# Patient Record
Sex: MALE | Race: ASIAN | NOT HISPANIC OR LATINO | URBAN - METROPOLITAN AREA
[De-identification: names, ages, dates, MRNs, and addresses within clinical notes are randomized per-mention and may not be internally consistent; named-entity substitution may affect disease eponyms.]

---

## 2021-01-22 ENCOUNTER — INPATIENT (INPATIENT)
Facility: HOSPITAL | Age: 71
LOS: 0 days | Discharge: HOME | End: 2021-01-23
Attending: INTERNAL MEDICINE | Admitting: INTERNAL MEDICINE
Payer: MEDICARE

## 2021-01-22 VITALS
TEMPERATURE: 98 F | OXYGEN SATURATION: 98 % | HEART RATE: 111 BPM | RESPIRATION RATE: 18 BRPM | DIASTOLIC BLOOD PRESSURE: 106 MMHG | SYSTOLIC BLOOD PRESSURE: 166 MMHG

## 2021-01-22 LAB
ALBUMIN SERPL ELPH-MCNC: 4.8 G/DL — SIGNIFICANT CHANGE UP (ref 3.5–5.2)
ALP SERPL-CCNC: 63 U/L — SIGNIFICANT CHANGE UP (ref 30–115)
ALT FLD-CCNC: 30 U/L — SIGNIFICANT CHANGE UP (ref 0–41)
ANION GAP SERPL CALC-SCNC: 13 MMOL/L — SIGNIFICANT CHANGE UP (ref 7–14)
AST SERPL-CCNC: 16 U/L — SIGNIFICANT CHANGE UP (ref 0–41)
BASOPHILS # BLD AUTO: 0.04 K/UL — SIGNIFICANT CHANGE UP (ref 0–0.2)
BASOPHILS NFR BLD AUTO: 0.5 % — SIGNIFICANT CHANGE UP (ref 0–1)
BILIRUB SERPL-MCNC: 0.7 MG/DL — SIGNIFICANT CHANGE UP (ref 0.2–1.2)
BUN SERPL-MCNC: 11 MG/DL — SIGNIFICANT CHANGE UP (ref 10–20)
CALCIUM SERPL-MCNC: 9.7 MG/DL — SIGNIFICANT CHANGE UP (ref 8.5–10.1)
CHLORIDE SERPL-SCNC: 87 MMOL/L — LOW (ref 98–110)
CO2 SERPL-SCNC: 24 MMOL/L — SIGNIFICANT CHANGE UP (ref 17–32)
CREAT SERPL-MCNC: 0.9 MG/DL — SIGNIFICANT CHANGE UP (ref 0.7–1.5)
EOSINOPHIL # BLD AUTO: 0.1 K/UL — SIGNIFICANT CHANGE UP (ref 0–0.7)
EOSINOPHIL NFR BLD AUTO: 1.2 % — SIGNIFICANT CHANGE UP (ref 0–8)
GLUCOSE SERPL-MCNC: 123 MG/DL — HIGH (ref 70–99)
HCT VFR BLD CALC: 45.3 % — SIGNIFICANT CHANGE UP (ref 42–52)
HGB BLD-MCNC: 15.8 G/DL — SIGNIFICANT CHANGE UP (ref 14–18)
IMM GRANULOCYTES NFR BLD AUTO: 0.5 % — HIGH (ref 0.1–0.3)
LYMPHOCYTES # BLD AUTO: 1.65 K/UL — SIGNIFICANT CHANGE UP (ref 1.2–3.4)
LYMPHOCYTES # BLD AUTO: 20.5 % — SIGNIFICANT CHANGE UP (ref 20.5–51.1)
MCHC RBC-ENTMCNC: 30.1 PG — SIGNIFICANT CHANGE UP (ref 27–31)
MCHC RBC-ENTMCNC: 34.9 G/DL — SIGNIFICANT CHANGE UP (ref 32–37)
MCV RBC AUTO: 86.3 FL — SIGNIFICANT CHANGE UP (ref 80–94)
MONOCYTES # BLD AUTO: 0.62 K/UL — HIGH (ref 0.1–0.6)
MONOCYTES NFR BLD AUTO: 7.7 % — SIGNIFICANT CHANGE UP (ref 1.7–9.3)
NEUTROPHILS # BLD AUTO: 5.58 K/UL — SIGNIFICANT CHANGE UP (ref 1.4–6.5)
NEUTROPHILS NFR BLD AUTO: 69.6 % — SIGNIFICANT CHANGE UP (ref 42.2–75.2)
NRBC # BLD: 0 /100 WBCS — SIGNIFICANT CHANGE UP (ref 0–0)
PLATELET # BLD AUTO: 305 K/UL — SIGNIFICANT CHANGE UP (ref 130–400)
POTASSIUM SERPL-MCNC: 3.7 MMOL/L — SIGNIFICANT CHANGE UP (ref 3.5–5)
POTASSIUM SERPL-SCNC: 3.7 MMOL/L — SIGNIFICANT CHANGE UP (ref 3.5–5)
PROT SERPL-MCNC: 7.9 G/DL — SIGNIFICANT CHANGE UP (ref 6–8)
RAPID RVP RESULT: SIGNIFICANT CHANGE UP
RBC # BLD: 5.25 M/UL — SIGNIFICANT CHANGE UP (ref 4.7–6.1)
RBC # FLD: 12.6 % — SIGNIFICANT CHANGE UP (ref 11.5–14.5)
SARS-COV-2 RNA SPEC QL NAA+PROBE: SIGNIFICANT CHANGE UP
SODIUM SERPL-SCNC: 124 MMOL/L — LOW (ref 135–146)
TROPONIN T SERPL-MCNC: <0.01 NG/ML — SIGNIFICANT CHANGE UP
WBC # BLD: 8.03 K/UL — SIGNIFICANT CHANGE UP (ref 4.8–10.8)
WBC # FLD AUTO: 8.03 K/UL — SIGNIFICANT CHANGE UP (ref 4.8–10.8)

## 2021-01-22 RX ORDER — LOSARTAN POTASSIUM 100 MG/1
100 TABLET, FILM COATED ORAL DAILY
Refills: 0 | Status: DISCONTINUED | OUTPATIENT
Start: 2021-01-22 | End: 2021-01-23

## 2021-01-22 RX ORDER — METOCLOPRAMIDE HCL 10 MG
10 TABLET ORAL ONCE
Refills: 0 | Status: COMPLETED | OUTPATIENT
Start: 2021-01-22 | End: 2021-01-22

## 2021-01-22 RX ORDER — CHLORHEXIDINE GLUCONATE 213 G/1000ML
1 SOLUTION TOPICAL
Refills: 0 | Status: DISCONTINUED | OUTPATIENT
Start: 2021-01-22 | End: 2021-01-23

## 2021-01-22 RX ORDER — AMLODIPINE BESYLATE 2.5 MG/1
5 TABLET ORAL DAILY
Refills: 0 | Status: DISCONTINUED | OUTPATIENT
Start: 2021-01-22 | End: 2021-01-23

## 2021-01-22 RX ORDER — HEPARIN SODIUM 5000 [USP'U]/ML
5000 INJECTION INTRAVENOUS; SUBCUTANEOUS EVERY 8 HOURS
Refills: 0 | Status: DISCONTINUED | OUTPATIENT
Start: 2021-01-22 | End: 2021-01-23

## 2021-01-22 RX ORDER — SODIUM CHLORIDE 9 MG/ML
1000 INJECTION INTRAMUSCULAR; INTRAVENOUS; SUBCUTANEOUS ONCE
Refills: 0 | Status: COMPLETED | OUTPATIENT
Start: 2021-01-22 | End: 2021-01-22

## 2021-01-22 RX ADMIN — HEPARIN SODIUM 5000 UNIT(S): 5000 INJECTION INTRAVENOUS; SUBCUTANEOUS at 15:02

## 2021-01-22 RX ADMIN — Medication 104 MILLIGRAM(S): at 06:41

## 2021-01-22 RX ADMIN — AMLODIPINE BESYLATE 5 MILLIGRAM(S): 2.5 TABLET ORAL at 12:08

## 2021-01-22 RX ADMIN — SODIUM CHLORIDE 1000 MILLILITER(S): 9 INJECTION INTRAMUSCULAR; INTRAVENOUS; SUBCUTANEOUS at 06:40

## 2021-01-22 RX ADMIN — LOSARTAN POTASSIUM 100 MILLIGRAM(S): 100 TABLET, FILM COATED ORAL at 12:08

## 2021-01-22 NOTE — ED PROVIDER NOTE - NS ED ROS FT
CONSTITUTIONAL: Negatve   SKIN: Negatve   HEAD: Negatve   EYES: Negatve   ENT: Negatve   NECK: Negatve   CARD:Negatve   RESP:Negatve   ABD: Negatve   EXT: Negatve  LYMPH: Negatve   NEURO: see hpi  PSYCH: Negatve

## 2021-01-22 NOTE — H&P ADULT - ASSESSMENT
[age] year old male with hx of HTN, active smoker with hx of COPD/emphysema, (apical scarring on outpt CT ), hx of thoracic aortic aneurysm with 4.1 cm (July 2020 last CT scan), hx of chronic hyponatremia (127), mild CAD (cath 2019, no stents) presenting for 1 day of headache and elevated bp at 180 mmHg systolic    Headache and elevated bp  -controlled now at 150 mmHG  -CT brain negative, nl kidney function  -resumed home meds, added norvasc 5 mg    - fu urine microalbumin     2.   COPD/emphysema, chronic apex scarring  -not in acute exacerbation   -counseled on need to quit smoking  -spoke to PMD Dr Arechiga, previous CT scan showed emphysematous changes and apices scarring   -duonebs started     3.   chronic hyponatremia, no AMS   -no need for acute intervention   -as per PMD baseline around 127, inpatient 124  -fu serum and urine osm, urine Na   -nephrology consulted for optimization of bp meds and hypoNa     4.  mild CAD (no hx of stents)/ hx of 4.1 cm ascending AA  - not on any statin or BB  - fu lipid profile, a1c   - outpt surveillance of AA    GI ppx: Po protonix 40 mg QD  DVT ppx: Heparin SC   Activity : ambulate as tolerated  Diet : DASH/TLC  Dispo: Home   Full Code  [age] year old male with hx of HTN, active smoker with hx of COPD/emphysema, (apical scarring on outpt CT ), hx of thoracic aortic aneurysm with 4.1 cm (July 2020 last CT scan), hx of chronic hyponatremia (127), mild CAD (cath 2019, no stents) presenting for 1 day of headache and elevated bp at 180 mmHg systolic    Headache and elevated bp  - controlled now at 150 mmHG  - CT brain negative, nl kidney function  -on olmesartan 40 mg ( switched to losartan), added norvasc 5 mg    - fu urine microalbumin/ ptein/ Cr ratio  - fu 2D echocardiogram      2.   COPD/emphysema, chronic apex scarring  -not in acute exacerbation   -counseled on need to quit smoking  -spoke to PMD Dr Arechiga, previous CT scan showed emphysematous changes and apices scarring   -duonebs started     3.   chronic hyponatremia, no AMS   -no need for acute intervention   -as per PMD baseline around 127, inpatient 124  -fu serum and urine osm, urine Na   -nephrology consulted for optimization of bp meds and hypoNa     4.  mild CAD (no hx of stents)/ hx of 4.1 cm ascending AA  - not on any statin or BB  - fu lipid profile, a1c   - outpt surveillance of AA    GI ppx: Po protonix 40 mg QD  DVT ppx: Heparin SC   Activity : ambulate as tolerated  Diet : DASH/TLC  Dispo: Home   Full Code

## 2021-01-22 NOTE — H&P ADULT - ATTENDING COMMENTS
71 yo male with PMH of HTN, CAD, COPD, thoracic aortic aneurysm 4.1cm, chronic hyponatremia came to ED for headache and elevated bp at 180 mmHg systolic. His BP has been elevated persistently for the last few days 170-180. He also c/o headache, he denies nausea, vomiting, blurry vision, SOB or chest pain.     PHYSICAL EXAM:  GENERAL: NAD, well-developed.  HEAD:  Atraumatic, Normocephalic.  EYES: EOMI, PERRLA, conjunctiva and sclera clear.  NECK: Supple, No JVD.  CHEST/LUNG: Clear to auscultation bilaterally; No wheeze.  HEART: Regular rate and rhythm; S1 S2.   ABDOMEN: Soft, Nontender, Nondistended; Bowel sounds present.  EXTREMITIES:  2+ Peripheral Pulses, No clubbing, cyanosis, or edema.  PSYCH: AAOx3.  NEUROLOGY: non-focal.  SKIN: No rashes or lesions.    A/P:   Moderate Hyponatremia: Chronic but worsening.   Continue Benicar (Losartan while inpatient), add Amlodipine 5mg daily.     Chronic Hyponatremia: Na 124.   Possibly from SAIDH, check urine Na, osmol, fluid restriction.     COPD stable. No wheezing.   Discharge home in 24hrs.

## 2021-01-22 NOTE — H&P ADULT - NSICDXPASTMEDICALHX_GEN_ALL_CORE_FT
PAST MEDICAL HISTORY:  Chronic hyponatremia     COPD, mild     Emphysema/COPD     Hypertension     Thoracic aortic aneurysm

## 2021-01-22 NOTE — ED PROVIDER NOTE - PHYSICAL EXAMINATION
VITAL SIGNS: I have reviewed nursing notes and confirm.  CONSTITUTIONAL: Well-developed; well-nourished; in no acute distress.  SKIN: Skin exam is warm and dry, no acute rash.  HEAD: Normocephalic; atraumatic.  EYES: PERRL, EOM intact; conjunctiva and sclera clear.  ENT: No nasal discharge; airway clear.  poor dentition, caries. dried blood at base of right bottom molars. no drainage, edema or abscess appreciated. non tender. no FB.  NECK: Supple; non tender.  CARD:+ S1, S2   RESP: No wheezes, rales or rhonchi.  ABD: Normal bowel sounds; soft; non-distended; non-tender;  EXT: Normal ROM. No cyanosis or edema.  LYMPH: No acute adenopathy.  NEURO: Alert and oriented, face symmetric. Strength 5/5 x 4 ext and symmetric, nml gross motor movement, nml RRM/HORTENSIA/FTN, nml gait. No focal deficits noted.  PSYCH: Cooperative, appropriate.

## 2021-01-22 NOTE — CONSULT NOTE ADULT - ASSESSMENT
70 year old male with hx of HTN, active smoker with hx of COPD/emphysema, (apical scarring on outpt CT ), hx of thoracic aortic aneurysm with 4.1 cm (July 2020 last CT scan), hx of chronic hyponatremia (127), mild CAD (cath 2019, no stents) presenting for 1 day of headache and elevated bp at 180 mmHg systolic. found to have hyponatremia     ·	Hyponatremia chronic rule out SIADH / Smoking / copd related  ·	check UA and urine lytes U osm serum osmolarity uric acid and TSH   ·	free water restriction to 1l for now  ·	do not add salt tablets   ·	increase amlodipine to 10 mg poq24h and continue ARB / no diuretics to be added / Add Labetalol 100 mg po q12h     will follow

## 2021-01-22 NOTE — H&P ADULT - HISTORY OF PRESENT ILLNESS
70 year old male with hx of HTN, active smoker with hx of COPD/emphysema, (apical scarring on outpt CT ), hx of thoracic aortic aneurysm with 4.1 cm (July 2020 last CT scan), hx of chronic hyponatremia (127), mild CAD (cath 2019, no stents) presenting for 1 day of headache and elevated bp at 180 mmHg systolic.     Patient states his bp was elevated in spite of taking his medication and he has been having headache for the last couple of days worst yesterday. He denies any chest pain, no shortness of breath, no increased abdominal girth, no lower extremity edema, no fever, no chills, no abdominal pain.     Vitals when examined stable with bp 150 systolic, CT brain negative for any acute processes   labs normal except for hypoNa 124 (chronic), troponin negative   patient being admitted for further work up 70 year old male with hx of HTN, active smoker with hx of COPD/emphysema, (apical scarring on outpt CT ), hx of thoracic aortic aneurysm with 4.1 cm (July 2020 last CT scan), hx of chronic hyponatremia (127), mild CAD (cath 2019, no stents) presenting for 1 day of headache and elevated bp at 180 mmHg systolic.     Patient states his bp was elevated in spite of taking his medication and he has been having headache for the last couple of days worst yesterday. He denies any chest pain, no shortness of breath, no increased abdominal girth, no lower extremity edema, no fever, no chills, no abdominal pain.     Vitals when examined stable with bp 150 systolic, CT brain negative for any acute processes   labs normal except for hypoNa 124 (chronic), troponin negative , ecg no ischemic signs or LVH  patient being admitted for further work up

## 2021-01-22 NOTE — ED PROVIDER NOTE - CLINICAL SUMMARY MEDICAL DECISION MAKING FREE TEXT BOX
cardona, htn. labs, ct head, ekg, fluids, supportive care. cardona, htn. labs, ct head, ekg, fluids, supportive care.  found to be hyponatremic. NS given. CT head neg. will admit.

## 2021-01-22 NOTE — ED ADULT NURSE NOTE - OBJECTIVE STATEMENT
Pt. complains of right sided headache, and elevated blood pressure started this morning. Pt. also complains of bleeding to right side of mouth. Pt. reported to have had low grade temps a few days ago, however no fevers today

## 2021-01-22 NOTE — ED ADULT TRIAGE NOTE - CHIEF COMPLAINT QUOTE
pt BIBA from home for fever and SOB. Pt is deaf and cantonese speaking. Pt wife endorses that pt has had fevers and SOB x2 days, right sided headache, high blood pressure started this morning ("180") and started taking a chinese blood pressure medicine "now he is bleeding from his teeth"   Pt afebrile in triage, NAD.

## 2021-01-22 NOTE — H&P ADULT - NSHPLABSRESULTS_GEN_ALL_CORE
15.8   8.03  )-----------( 305      ( 22 Jan 2021 04:00 )             45.3       01-22    124<L>  |  87<L>  |  11  ----------------------------<  123<H>  3.7   |  24  |  0.9    Ca    9.7      22 Jan 2021 04:00    TPro  7.9  /  Alb  4.8  /  TBili  0.7  /  DBili  x   /  AST  16  /  ALT  30  /  AlkPhos  63  01-22      LIVER FUNCTIONS - ( 22 Jan 2021 04:00 )  Alb: 4.8 g/dL / Pro: 7.9 g/dL / ALK PHOS: 63 U/L / ALT: 30 U/L / AST: 16 U/L / GGT: x                         CARDIAC MARKERS ( 22 Jan 2021 04:00 )  x     / <0.01 ng/mL / x     / x     / x

## 2021-01-22 NOTE — CONSULT NOTE ADULT - SUBJECTIVE AND OBJECTIVE BOX
NEPHROLOGY CONSULTATION NOTE    THIS CONSULT IS INCOMPLETE / FULL CONSULT TO FOLLOW    Patient is a 70y Male whom presented to the hospital with     PAST MEDICAL & SURGICAL HISTORY:  Emphysema/COPD    Chronic hyponatremia    Thoracic aortic aneurysm    COPD, mild    Hypertension    No significant past surgical history      Allergies:  No Known Allergies    Home Medications Reviewed  Hospital Medications:   MEDICATIONS  (STANDING):  amLODIPine   Tablet 5 milliGRAM(s) Oral daily  chlorhexidine 4% Liquid 1 Application(s) Topical <User Schedule>  heparin   Injectable 5000 Unit(s) SubCutaneous every 8 hours  losartan 100 milliGRAM(s) Oral daily      SOCIAL HISTORY:  Denies ETOH,Smoking,   FAMILY HISTORY:  FH: hypertension          REVIEW OF SYSTEMS:  CONSTITUTIONAL: No weakness, fevers or chills  EYES/ENT: No visual changes;  No vertigo or throat pain   NECK: No pain or stiffness  RESPIRATORY: No cough, wheezing, hemoptysis; No shortness of breath  CARDIOVASCULAR: No chest pain or palpitations.  GASTROINTESTINAL: No abdominal or epigastric pain. No nausea, vomiting, or hematemesis; No diarrhea or constipation. No melena or hematochezia.  GENITOURINARY: No dysuria, frequency, foamy urine, urinary urgency, incontinence or hematuria  NEUROLOGICAL: No numbness or weakness  SKIN: No itching, burning, rashes, or lesions   VASCULAR: No bilateral lower extremity edema.   All other review of systems is negative unless indicated above.    VITALS:  T(F): 98.6 (01-22-21 @ 10:58), Max: 98.6 (01-22-21 @ 10:58)  HR: 75 (01-22-21 @ 12:06)  BP: 165/95 (01-22-21 @ 12:06)  RR: 18 (01-22-21 @ 10:58)  SpO2: 97% (01-22-21 @ 10:58)        I&O's Detail        PHYSICAL EXAM:  Constitutional: NAD  HEENT: anicteric sclera, oropharynx clear, MMM  Neck: No JVD  Respiratory: CTAB, no wheezes, rales or rhonchi  Cardiovascular: S1, S2, RRR  Gastrointestinal: BS+, soft, NT/ND  Extremities: No cyanosis or clubbing. No peripheral edema  Neurological: A/O x 3, no focal deficits  Psychiatric: Normal mood, normal affect  : No CVA tenderness. No marx.   Skin: No rashes  Vascular Access:    LABS:  01-22    124<L>  |  87<L>  |  11  ----------------------------<  123<H>  3.7   |  24  |  0.9    Ca    9.7      22 Jan 2021 04:00    TPro  7.9  /  Alb  4.8  /  TBili  0.7  /  DBili      /  AST  16  /  ALT  30  /  AlkPhos  63  01-22    Creatinine Trend: 0.9 <--                        15.8   8.03  )-----------( 305      ( 22 Jan 2021 04:00 )             45.3     Urine Studies:              RADIOLOGY & ADDITIONAL STUDIES:                 NEPHROLOGY CONSULTATION NOTE    History obtained from chart     70 year old male with hx of HTN, active smoker with hx of COPD/emphysema, (apical scarring on outpt CT ), hx of thoracic aortic aneurysm with 4.1 cm (July 2020 last CT scan), hx of chronic hyponatremia (127), mild CAD (cath 2019, no stents) presenting for 1 day of headache and elevated bp at 180 mmHg systolic.     Patient on admission was noted to have high blood pressure and hyponatremia   Seen today ambulating in the ED   No seizure disorder no focal motor sensory deficit no diarrhea no bowel bladder disturbances     PAST MEDICAL & SURGICAL HISTORY:    Emphysema/COPD  Chronic hyponatremia  Thoracic aortic aneurysm  COPD, mild  Hypertension    No significant past surgical history      Allergies:  No Known Allergies    Home Medications Reviewed  Hospital Medications:   MEDICATIONS  (STANDING):  amLODIPine   Tablet 5 milliGRAM(s) Oral daily  chlorhexidine 4% Liquid 1 Application(s) Topical <User Schedule>  heparin   Injectable 5000 Unit(s) SubCutaneous every 8 hours  losartan 100 milliGRAM(s) Oral daily      SOCIAL HISTORY:  Denies ETOH,Smoking,   FAMILY HISTORY:  FH: hypertension          REVIEW OF SYSTEMS:  All other review of systems is negative unless indicated above.    VITALS:  T(F): 98.6 (01-22-21 @ 10:58), Max: 98.6 (01-22-21 @ 10:58)  HR: 75 (01-22-21 @ 12:06)  BP: 165/95 (01-22-21 @ 12:06)  RR: 18 (01-22-21 @ 10:58)  SpO2: 97% (01-22-21 @ 10:58)        I&O's Detail        PHYSICAL EXAM:  Constitutional: NAD  Neck: No JVD  Respiratory: CTAB,   Cardiovascular: S1, S2, RRR  Gastrointestinal: BS+, soft, NT/ND  Extremities: No cyanosis or clubbing. No peripheral edema  Neurological: A/O x 3, no focal deficits  : No CVA tenderness. No marx.   Skin: No rashes  Vascular Access:    LABS:  01-22    124<L>  |  87<L>  |  11  ----------------------------<  123<H>  3.7   |  24  |  0.9    Ca    9.7      22 Jan 2021 04:00    TPro  7.9  /  Alb  4.8  /  TBili  0.7  /  DBili      /  AST  16  /  ALT  30  /  AlkPhos  63  01-22    Creatinine Trend: 0.9 <--                        15.8   8.03  )-----------( 305      ( 22 Jan 2021 04:00 )             45.3     Urine Studies:              RADIOLOGY & ADDITIONAL STUDIES:  < from: CT Head No Cont (01.22.21 @ 04:46) >  IMPRESSION:    No CT evidence of acute intracranial pathology.    < end of copied text >

## 2021-01-22 NOTE — ED PROVIDER NOTE - OBJECTIVE STATEMENT
Visit translated via daughter/wife on speaker phone:  pt co elev bp and ha since tonight. also some bleeding from his right lower teeth. no fever, chills, sob, cough, ab pain, n, v, d.  denies trauma. not on anticoag.

## 2021-01-23 ENCOUNTER — TRANSCRIPTION ENCOUNTER (OUTPATIENT)
Age: 71
End: 2021-01-23

## 2021-01-23 VITALS — HEART RATE: 96 BPM

## 2021-01-23 LAB
ALBUMIN SERPL ELPH-MCNC: 4.6 G/DL — SIGNIFICANT CHANGE UP (ref 3.5–5.2)
ALP SERPL-CCNC: 63 U/L — SIGNIFICANT CHANGE UP (ref 30–115)
ALT FLD-CCNC: 32 U/L — SIGNIFICANT CHANGE UP (ref 0–41)
ANION GAP SERPL CALC-SCNC: 12 MMOL/L — SIGNIFICANT CHANGE UP (ref 7–14)
AST SERPL-CCNC: 17 U/L — SIGNIFICANT CHANGE UP (ref 0–41)
BASOPHILS # BLD AUTO: 0.04 K/UL — SIGNIFICANT CHANGE UP (ref 0–0.2)
BASOPHILS NFR BLD AUTO: 0.5 % — SIGNIFICANT CHANGE UP (ref 0–1)
BILIRUB SERPL-MCNC: 0.9 MG/DL — SIGNIFICANT CHANGE UP (ref 0.2–1.2)
BUN SERPL-MCNC: 12 MG/DL — SIGNIFICANT CHANGE UP (ref 10–20)
CALCIUM SERPL-MCNC: 9.7 MG/DL — SIGNIFICANT CHANGE UP (ref 8.5–10.1)
CHLORIDE SERPL-SCNC: 97 MMOL/L — LOW (ref 98–110)
CO2 SERPL-SCNC: 26 MMOL/L — SIGNIFICANT CHANGE UP (ref 17–32)
CREAT ?TM UR-MCNC: 119 MG/DL — SIGNIFICANT CHANGE UP
CREAT ?TM UR-MCNC: 119 MG/DL — SIGNIFICANT CHANGE UP
CREAT ?TM UR-MCNC: 121 MG/DL — SIGNIFICANT CHANGE UP
CREAT ?TM UR-MCNC: 121 MG/DL — SIGNIFICANT CHANGE UP
CREAT SERPL-MCNC: 1 MG/DL — SIGNIFICANT CHANGE UP (ref 0.7–1.5)
EOSINOPHIL # BLD AUTO: 0.11 K/UL — SIGNIFICANT CHANGE UP (ref 0–0.7)
EOSINOPHIL NFR BLD AUTO: 1.4 % — SIGNIFICANT CHANGE UP (ref 0–8)
GLUCOSE SERPL-MCNC: 112 MG/DL — HIGH (ref 70–99)
HCT VFR BLD CALC: 46.8 % — SIGNIFICANT CHANGE UP (ref 42–52)
HCV AB S/CO SERPL IA: 0.03 COI — SIGNIFICANT CHANGE UP
HCV AB SERPL-IMP: SIGNIFICANT CHANGE UP
HGB BLD-MCNC: 16 G/DL — SIGNIFICANT CHANGE UP (ref 14–18)
IMM GRANULOCYTES NFR BLD AUTO: 0.4 % — HIGH (ref 0.1–0.3)
LYMPHOCYTES # BLD AUTO: 1.68 K/UL — SIGNIFICANT CHANGE UP (ref 1.2–3.4)
LYMPHOCYTES # BLD AUTO: 20.9 % — SIGNIFICANT CHANGE UP (ref 20.5–51.1)
MAGNESIUM SERPL-MCNC: 2.2 MG/DL — SIGNIFICANT CHANGE UP (ref 1.8–2.4)
MCHC RBC-ENTMCNC: 30.1 PG — SIGNIFICANT CHANGE UP (ref 27–31)
MCHC RBC-ENTMCNC: 34.2 G/DL — SIGNIFICANT CHANGE UP (ref 32–37)
MCV RBC AUTO: 88 FL — SIGNIFICANT CHANGE UP (ref 80–94)
MICROALBUMIN UR-MCNC: 4.3 MG/DL — SIGNIFICANT CHANGE UP
MICROALBUMIN/CREAT UR-RTO: 35 MG/G — HIGH (ref 0–30)
MONOCYTES # BLD AUTO: 1.03 K/UL — HIGH (ref 0.1–0.6)
MONOCYTES NFR BLD AUTO: 12.8 % — HIGH (ref 1.7–9.3)
NEUTROPHILS # BLD AUTO: 5.14 K/UL — SIGNIFICANT CHANGE UP (ref 1.4–6.5)
NEUTROPHILS NFR BLD AUTO: 64 % — SIGNIFICANT CHANGE UP (ref 42.2–75.2)
NRBC # BLD: 0 /100 WBCS — SIGNIFICANT CHANGE UP (ref 0–0)
PLATELET # BLD AUTO: 303 K/UL — SIGNIFICANT CHANGE UP (ref 130–400)
POTASSIUM SERPL-MCNC: 4.2 MMOL/L — SIGNIFICANT CHANGE UP (ref 3.5–5)
POTASSIUM SERPL-SCNC: 4.2 MMOL/L — SIGNIFICANT CHANGE UP (ref 3.5–5)
PROT ?TM UR-MCNC: 15 MG/DLG/24H — SIGNIFICANT CHANGE UP
PROT ?TM UR-MCNC: 16 MG/DLG/24H — SIGNIFICANT CHANGE UP
PROT SERPL-MCNC: 7.6 G/DL — SIGNIFICANT CHANGE UP (ref 6–8)
PROT/CREAT UR-RTO: 0.1 RATIO — SIGNIFICANT CHANGE UP (ref 0–0.2)
PROT/CREAT UR-RTO: 0.1 RATIO — SIGNIFICANT CHANGE UP (ref 0–0.2)
RBC # BLD: 5.32 M/UL — SIGNIFICANT CHANGE UP (ref 4.7–6.1)
RBC # FLD: 13 % — SIGNIFICANT CHANGE UP (ref 11.5–14.5)
SODIUM SERPL-SCNC: 135 MMOL/L — SIGNIFICANT CHANGE UP (ref 135–146)
WBC # BLD: 8.03 K/UL — SIGNIFICANT CHANGE UP (ref 4.8–10.8)
WBC # FLD AUTO: 8.03 K/UL — SIGNIFICANT CHANGE UP (ref 4.8–10.8)

## 2021-01-23 RX ORDER — MONTELUKAST 4 MG/1
1 TABLET, CHEWABLE ORAL
Qty: 0 | Refills: 0 | DISCHARGE

## 2021-01-23 RX ORDER — MONTELUKAST 4 MG/1
1 TABLET, CHEWABLE ORAL
Qty: 30 | Refills: 2
Start: 2021-01-23 | End: 2021-04-22

## 2021-01-23 RX ORDER — OLMESARTAN MEDOXOMIL 5 MG/1
1 TABLET, FILM COATED ORAL
Qty: 0 | Refills: 0 | DISCHARGE

## 2021-01-23 RX ORDER — LABETALOL HCL 100 MG
1 TABLET ORAL
Qty: 60 | Refills: 2
Start: 2021-01-23 | End: 2021-04-22

## 2021-01-23 RX ORDER — ALBUTEROL 90 UG/1
2 AEROSOL, METERED ORAL
Qty: 0 | Refills: 0 | DISCHARGE

## 2021-01-23 RX ORDER — OLMESARTAN MEDOXOMIL 5 MG/1
1 TABLET, FILM COATED ORAL
Qty: 30 | Refills: 2
Start: 2021-01-23 | End: 2021-04-22

## 2021-01-23 RX ORDER — BUDESONIDE AND FORMOTEROL FUMARATE DIHYDRATE 160; 4.5 UG/1; UG/1
2 AEROSOL RESPIRATORY (INHALATION)
Qty: 0 | Refills: 0 | DISCHARGE

## 2021-01-23 RX ORDER — AMLODIPINE BESYLATE 2.5 MG/1
1 TABLET ORAL
Qty: 30 | Refills: 2
Start: 2021-01-23 | End: 2021-04-22

## 2021-01-23 RX ORDER — BUDESONIDE AND FORMOTEROL FUMARATE DIHYDRATE 160; 4.5 UG/1; UG/1
2 AEROSOL RESPIRATORY (INHALATION)
Qty: 2 | Refills: 0
Start: 2021-01-23 | End: 2021-02-21

## 2021-01-23 RX ORDER — LABETALOL HCL 100 MG
100 TABLET ORAL
Refills: 0 | Status: DISCONTINUED | OUTPATIENT
Start: 2021-01-23 | End: 2021-01-23

## 2021-01-23 RX ORDER — AMLODIPINE BESYLATE 2.5 MG/1
10 TABLET ORAL DAILY
Refills: 0 | Status: DISCONTINUED | OUTPATIENT
Start: 2021-01-23 | End: 2021-01-23

## 2021-01-23 RX ORDER — ALBUTEROL 90 UG/1
2 AEROSOL, METERED ORAL
Qty: 2 | Refills: 0
Start: 2021-01-23 | End: 2021-02-21

## 2021-01-23 RX ADMIN — AMLODIPINE BESYLATE 5 MILLIGRAM(S): 2.5 TABLET ORAL at 06:41

## 2021-01-23 RX ADMIN — LOSARTAN POTASSIUM 100 MILLIGRAM(S): 100 TABLET, FILM COATED ORAL at 06:41

## 2021-01-23 RX ADMIN — HEPARIN SODIUM 5000 UNIT(S): 5000 INJECTION INTRAVENOUS; SUBCUTANEOUS at 06:41

## 2021-01-23 RX ADMIN — AMLODIPINE BESYLATE 10 MILLIGRAM(S): 2.5 TABLET ORAL at 11:52

## 2021-01-23 NOTE — DISCHARGE NOTE PROVIDER - NSDCMRMEDTOKEN_GEN_ALL_CORE_FT
amLODIPine 10 mg oral tablet: 1 tab(s) orally once a day  Benicar 40 mg oral tablet: 1 tab(s) orally once a day  labetalol 100 mg oral tablet: 1 tab(s) orally 2 times a day  Singulair 4 mg oral tablet, chewable: 1 tab(s) orally once a day  Symbicort 160 mcg-4.5 mcg/inh inhalation aerosol: 2 puff(s) inhaled 2 times a day  Ventolin HFA 90 mcg/inh inhalation aerosol: 2 puff(s) inhaled every 6 hours

## 2021-01-23 NOTE — PROGRESS NOTE ADULT - ASSESSMENT
70 year old man with hx of HTN, active smoker with hx of COPD/emphysema, (apical scarring on outpt CT), hx of thoracic aortic aneurysm with 4.1 cm (July 2020 last CT scan), hx of chronic hyponatremia (127), mild CAD (cath 2019, no stents) presenting for 1 day of headache and elevated bp at 180 mmHg systolic.    # Headache and elevated bp (hypertensive urgency)  BP OK now  meds adjusted  CT brain negative, nl kidney function  on olmesartan 40 mg - resume on d/c (losartan here)  add norvasc 5 mg  q24  add labetalol 100mg po q12  echo outpt    # COPD/emphysema, chronic apex scarring - not in acute exacerbation   quit smoking  duonebs prn     # chronic hyponatremia, no AMS   Na ok today  as per PMD baseline around 127    # mild CAD (no hx of stents)/ hx of 4.1 cm ascending AA  fu lipid profile, a1c as outpt  outpt surveillance of AA    # GI ppx: not needed    # DVT ppx: Heparin SC     # Activity : ambulate as tolerated    # Diet : DASH/TLC    # Full Code     Dispo: d/c Home today  
70 year old male with hx of HTN, active smoker with hx of COPD/emphysema, (apical scarring on outpt CT ), hx of thoracic aortic aneurysm with 4.1 cm (July 2020 last CT scan), hx of chronic hyponatremia (127), mild CAD (cath 2019, no stents) presenting for 1 day of headache and elevated bp at 180 mmHg systolic. found to have hyponatremia     ·	Hyponatremia chronic rule out SIADH / Smoking / copd related  ·	serum sodium ay normal levels now   ·	free water restriction to 1l for now  ·	do not add salt tablets   ·	HTN keep current  ·	discharged home will need OP renal follow up

## 2021-01-23 NOTE — PROGRESS NOTE ADULT - SUBJECTIVE AND OBJECTIVE BOX
Nephrology progress note    THIS IS AN INCOMPLETE NOTE . FULL NOTE TO FOLLOW SHORTLY    Patient is seen and examined, events over the last 24 h noted .    Allergies:  No Known Allergies    Hospital Medications:   MEDICATIONS  (STANDING):  amLODIPine   Tablet 10 milliGRAM(s) Oral daily  chlorhexidine 4% Liquid 1 Application(s) Topical <User Schedule>  heparin   Injectable 5000 Unit(s) SubCutaneous every 8 hours  labetalol 100 milliGRAM(s) Oral two times a day  losartan 100 milliGRAM(s) Oral daily        VITALS:  T(F): 97.7 (01-23-21 @ 05:55), Max: 98.8 (01-22-21 @ 22:54)  HR: 96 (01-23-21 @ 08:55)  BP: 139/85 (01-23-21 @ 05:55)  RR: 20 (01-23-21 @ 05:55)  SpO2: 97% (01-23-21 @ 05:55)  Wt(kg): --        PHYSICAL EXAM:  Constitutional: NAD  HEENT: anicteric sclera, oropharynx clear, MMM  Neck: No JVD  Respiratory: CTAB, no wheezes, rales or rhonchi  Cardiovascular: S1, S2, RRR  Gastrointestinal: BS+, soft, NT/ND  Extremities: No cyanosis or clubbing. No peripheral edema  :  No marx.   Skin: No rashes    LABS:  01-23    135  |  97<L>  |  12  ----------------------------<  112<H>  4.2   |  26  |  1.0    Ca    9.7      23 Jan 2021 06:17  Mg     2.2     01-23    TPro  7.6  /  Alb  4.6  /  TBili  0.9  /  DBili      /  AST  17  /  ALT  32  /  AlkPhos  63  01-23                          16.0   8.03  )-----------( 303      ( 23 Jan 2021 06:17 )             46.8       Urine Studies:      RADIOLOGY & ADDITIONAL STUDIES:   Nephrology progress note  Patient is seen and examined, events over the last 24 h noted .  seen today  No complaints     Allergies:  No Known Allergies    Hospital Medications:   MEDICATIONS  (STANDING):  amLODIPine   Tablet 10 milliGRAM(s) Oral daily  chlorhexidine 4% Liquid 1 Application(s) Topical <User Schedule>  heparin   Injectable 5000 Unit(s) SubCutaneous every 8 hours  labetalol 100 milliGRAM(s) Oral two times a day  losartan 100 milliGRAM(s) Oral daily        VITALS:  T(F): 97.7 (01-23-21 @ 05:55), Max: 98.8 (01-22-21 @ 22:54)  HR: 96 (01-23-21 @ 08:55)  BP: 139/85 (01-23-21 @ 05:55)  RR: 20 (01-23-21 @ 05:55)  SpO2: 97% (01-23-21 @ 05:55)          PHYSICAL EXAM:  Constitutional: NAD  HEENT: anicteric sclera, oropharynx clear, MMM  Neck: No JVD  Respiratory: CTAB, no wheezes, rales or rhonchi  Cardiovascular: S1, S2, RRR  Gastrointestinal: BS+, soft, NT/ND  Extremities: No cyanosis or clubbing. No peripheral edema  Skin: No rashes    LABS:  01-23    135  |  97<L>  |  12  ----------------------------<  112<H>  4.2   |  26  |  1.0    Ca    9.7      23 Jan 2021 06:17  Mg     2.2     01-23    TPro  7.6  /  Alb  4.6  /  TBili  0.9  /  DBili      /  AST  17  /  ALT  32  /  AlkPhos  63  01-23                          16.0   8.03  )-----------( 303      ( 23 Jan 2021 06:17 )             46.8       Urine Studies:      RADIOLOGY & ADDITIONAL STUDIES:

## 2021-01-23 NOTE — DISCHARGE NOTE PROVIDER - HOSPITAL COURSE
70 year old male with hx of HTN, active smoker with hx of COPD/emphysema, (apical scarring on outpt CT ), hx of thoracic aortic aneurysm with 4.1 cm (July 2020 last CT scan), hx of chronic hyponatremia (127), mild CAD (cath 2019, no stents) presenting for 1 day of headache and elevated bp at 180 mmHg systolic.     Patient states his bp was elevated in spite of taking his medication and he has been having headache for the last couple of days worst yesterday. He denies any chest pain, no shortness of breath, no increased abdominal girth, no lower extremity edema, no fever, no chills, no abdominal pain.     Vitals when examined stable with bp 150 systolic, CT brain negative for any acute processes   labs normal except for hypoNa 124 (chronic), troponin negative , ecg no ischemic signs or LVH  patient being admitted for further work up      Hyponatremia - Chronic; No AMS; As per PCP baseline at 127; No intervention  Headache & Uncontrolled HTN  - CT Brain(1/22): Negative / Kidney Function WNL  - HTN controlled w/ Losartan, Norvasc 10mg Daily, Labetalol PO q12.

## 2021-01-23 NOTE — PROGRESS NOTE ADULT - SUBJECTIVE AND OBJECTIVE BOX
PHYLLISMATTIE  70y  Male  ***My note supersedes ALL resident notes that I sign.  My corrections for their notes are in my note.***    I can be reached directly on [id number]. My office number is [phone]. My personal cell number is [phone].    [place] Int: [id number] ->  said pt speaks [place], so I called his dtr, [phone]    INTERVAL EVENTS: Here for f/u of HTN. PT feels much better. H/A gone. Pt would like to go home. Rx #: [phone].    T(F): 97.7 (01-23-21 @ 05:55), Max: 98.8 (01-22-21 @ 22:54)  HR: 96 (01-23-21 @ 08:55) (96 - 137)  BP: 139/85 (01-23-21 @ 05:55) (139/85 - 156/82)  RR: 20 (01-23-21 @ 05:55) (20 - 20)  SpO2: 97% (01-23-21 @ 05:55) (97% - 98%)    Gen: NAD  HEENT: PERRL, EOMI, mouth clr, nose clr  Neck: no nodes, no JVD, thyroid nl  lungs: clr  hrt: s1 s2 rrr no murmur  abd: soft, NT/ND, no HS megaly  ext: no edema, no c/c  neuro: aa ox3, cn intact, can move all 4 ext    LABS:                      16.0    (    88.0   8.03  )-----------( ---------      303      ( 23 Jan 2021 06:17 )             46.8    (    13.0     135   (   97   (   112      01-23-21 @ 06:17  ----------------------               4.2   (   26   (   12                             -----                        1.0  Ca  9.7   Mg  2.2    P   --     LFT  7.6  (  0.9  (  17       01-23-21 @ 06:17  -------------------------  4.6  (  63  (  32    CARDIAC MARKERS ( 22 Jan 2021 04:00 )  x     / <0.01 ng/mL / x     / x     / x        RADIOLOGY & ADDITIONAL TESTS:  < from: CT Head No Cont (01.22.21 @ 04:46) >  IMPRESSION:    No CT evidence of acute intracranial pathology.    < end of copied text >    < from: Xray Chest 1 View- PORTABLE-Urgent (Xray Chest 1 View- PORTABLE-Urgent .) (01.22.21 @ 04:29) >  IMPRESSION:    No radiographic evidence of acute pulmonary disease.    < end of copied text >      MEDICATIONS:    amLODIPine   Tablet 10 milliGRAM(s) Oral daily  chlorhexidine 4% Liquid 1 Application(s) Topical <User Schedule>  heparin   Injectable 5000 Unit(s) SubCutaneous every 8 hours  labetalol 100 milliGRAM(s) Oral two times a day  losartan 100 milliGRAM(s) Oral daily

## 2021-01-23 NOTE — DISCHARGE NOTE NURSING/CASE MANAGEMENT/SOCIAL WORK - PATIENT PORTAL LINK FT
You can access the FollowMyHealth Patient Portal offered by [facility] by registering at the following website: [email or web address]. By joining [employer]’s FollowMyHealth portal, you will also be able to view your health information using other applications (apps) compatible with our system.

## 2021-01-24 LAB
MICROALBUMIN UR-MCNC: 4.5 MG/DL — SIGNIFICANT CHANGE UP
MICROALBUMIN/CREAT UR-RTO: 36 MG/G — HIGH (ref 0–30)

## 2021-01-28 DIAGNOSIS — Z82.49 FAMILY HISTORY OF ISCHEMIC HEART DISEASE AND OTHER DISEASES OF THE CIRCULATORY SYSTEM: ICD-10-CM

## 2021-01-28 DIAGNOSIS — E87.1 HYPO-OSMOLALITY AND HYPONATREMIA: ICD-10-CM

## 2021-01-28 DIAGNOSIS — F17.200 NICOTINE DEPENDENCE, UNSPECIFIED, UNCOMPLICATED: ICD-10-CM

## 2021-01-28 DIAGNOSIS — I10 ESSENTIAL (PRIMARY) HYPERTENSION: ICD-10-CM

## 2021-01-28 DIAGNOSIS — R06.02 SHORTNESS OF BREATH: ICD-10-CM

## 2021-01-28 DIAGNOSIS — I16.0 HYPERTENSIVE URGENCY: ICD-10-CM

## 2021-01-28 DIAGNOSIS — I71.2 THORACIC AORTIC ANEURYSM, WITHOUT RUPTURE: ICD-10-CM

## 2021-01-28 DIAGNOSIS — I25.10 ATHEROSCLEROTIC HEART DISEASE OF NATIVE CORONARY ARTERY WITHOUT ANGINA PECTORIS: ICD-10-CM

## 2021-01-28 DIAGNOSIS — J43.9 EMPHYSEMA, UNSPECIFIED: ICD-10-CM

## 2024-07-30 NOTE — ED ADULT NURSE NOTE - NS ED NURSE REPORT GIVEN DT
T(C): 36.9 (07-30-24 @ 10:10), Max: 36.9 (07-30-24 @ 10:10)  HR: 75 (07-30-24 @ 13:08) (75 - 79)  BP: 113/59 (07-30-24 @ 13:08) (113/59 - 113/73)  RR: 16 (07-30-24 @ 10:10) (16 - 16)  SpO2: 100% (07-30-24 @ 10:10) (100% - 100%)  Gen: NAD, well-appearing  Pulm: Resting comfortably on room air  Abd: Soft, gravid  Ext: Non-edematous, non-tender     SVE: not performed  SSE: not performed  FHT: baseline 140, moderate variability, +accels, -decels  [place]: not padma 22-Jan-2021 20:58 T(C): 36.9 (07-30-24 @ 10:10), Max: 36.9 (07-30-24 @ 10:10)  HR: 75 (07-30-24 @ 13:08) (75 - 79)  BP: 113/59 (07-30-24 @ 13:08) (113/59 - 113/73)  RR: 16 (07-30-24 @ 10:10) (16 - 16)  SpO2: 100% (07-30-24 @ 10:10) (100% - 100%)  Gen: NAD, well-appearing  Pulm: Resting comfortably on room air  Abd: Soft, gravid  Ext: Non-edematous, non-tender     SVE: 0.5/0/-3  SSE: not performed  FHT: baseline 140, moderate variability, +accels, -decels  [place]: irregular contractions

## 2024-08-26 ENCOUNTER — INPATIENT (INPATIENT)
Facility: HOSPITAL | Age: 74
LOS: 2 days | Discharge: HOME | DRG: 55 | End: 2024-08-29
Attending: INTERNAL MEDICINE | Admitting: STUDENT IN AN ORGANIZED HEALTH CARE EDUCATION/TRAINING PROGRAM
Payer: MEDICARE

## 2024-08-26 VITALS
WEIGHT: 164.91 LBS | DIASTOLIC BLOOD PRESSURE: 105 MMHG | SYSTOLIC BLOOD PRESSURE: 188 MMHG | OXYGEN SATURATION: 99 % | RESPIRATION RATE: 18 BRPM | HEART RATE: 71 BPM | TEMPERATURE: 98 F | HEIGHT: 67 IN

## 2024-08-26 DIAGNOSIS — D32.0 BENIGN NEOPLASM OF CEREBRAL MENINGES: ICD-10-CM

## 2024-08-26 PROBLEM — J43.9 EMPHYSEMA, UNSPECIFIED: Chronic | Status: ACTIVE | Noted: 2021-01-22

## 2024-08-26 PROBLEM — E87.1 HYPO-OSMOLALITY AND HYPONATREMIA: Chronic | Status: ACTIVE | Noted: 2021-01-22

## 2024-08-26 PROBLEM — I10 ESSENTIAL (PRIMARY) HYPERTENSION: Chronic | Status: ACTIVE | Noted: 2021-01-22

## 2024-08-26 PROBLEM — I71.2 THORACIC AORTIC ANEURYSM, WITHOUT RUPTURE: Chronic | Status: ACTIVE | Noted: 2021-01-22

## 2024-08-26 PROBLEM — J44.9 CHRONIC OBSTRUCTIVE PULMONARY DISEASE, UNSPECIFIED: Chronic | Status: ACTIVE | Noted: 2021-01-22

## 2024-08-26 LAB
ALBUMIN SERPL ELPH-MCNC: 4.4 G/DL — SIGNIFICANT CHANGE UP (ref 3.5–5.2)
ALP SERPL-CCNC: 52 U/L — SIGNIFICANT CHANGE UP (ref 30–115)
ALT FLD-CCNC: 19 U/L — SIGNIFICANT CHANGE UP (ref 0–41)
ANION GAP SERPL CALC-SCNC: 12 MMOL/L — SIGNIFICANT CHANGE UP (ref 7–14)
AST SERPL-CCNC: 14 U/L — SIGNIFICANT CHANGE UP (ref 0–41)
BASE EXCESS BLDV CALC-SCNC: 3.8 MMOL/L — HIGH (ref -2–3)
BASOPHILS # BLD AUTO: 0.06 K/UL — SIGNIFICANT CHANGE UP (ref 0–0.2)
BASOPHILS NFR BLD AUTO: 0.8 % — SIGNIFICANT CHANGE UP (ref 0–1)
BILIRUB SERPL-MCNC: 0.4 MG/DL — SIGNIFICANT CHANGE UP (ref 0.2–1.2)
BUN SERPL-MCNC: 20 MG/DL — SIGNIFICANT CHANGE UP (ref 10–20)
CA-I SERPL-SCNC: 1.2 MMOL/L — SIGNIFICANT CHANGE UP (ref 1.15–1.33)
CALCIUM SERPL-MCNC: 8.9 MG/DL — SIGNIFICANT CHANGE UP (ref 8.4–10.5)
CHLORIDE SERPL-SCNC: 91 MMOL/L — LOW (ref 98–110)
CO2 SERPL-SCNC: 24 MMOL/L — SIGNIFICANT CHANGE UP (ref 17–32)
CREAT SERPL-MCNC: 0.9 MG/DL — SIGNIFICANT CHANGE UP (ref 0.7–1.5)
EGFR: 91 ML/MIN/1.73M2 — SIGNIFICANT CHANGE UP
EOSINOPHIL # BLD AUTO: 0.35 K/UL — SIGNIFICANT CHANGE UP (ref 0–0.7)
EOSINOPHIL NFR BLD AUTO: 4.7 % — SIGNIFICANT CHANGE UP (ref 0–8)
FLUAV AG NPH QL: SIGNIFICANT CHANGE UP
FLUBV AG NPH QL: SIGNIFICANT CHANGE UP
GAS PNL BLDV: 121 MMOL/L — LOW (ref 136–145)
GAS PNL BLDV: SIGNIFICANT CHANGE UP
GAS PNL BLDV: SIGNIFICANT CHANGE UP
GLUCOSE SERPL-MCNC: 138 MG/DL — HIGH (ref 70–99)
HCO3 BLDV-SCNC: 28 MMOL/L — SIGNIFICANT CHANGE UP (ref 22–29)
HCT VFR BLD CALC: 41.5 % — LOW (ref 42–52)
HCT VFR BLDA CALC: 46 % — SIGNIFICANT CHANGE UP (ref 39–51)
HGB BLD CALC-MCNC: 15.2 G/DL — SIGNIFICANT CHANGE UP (ref 12.6–17.4)
HGB BLD-MCNC: 14.6 G/DL — SIGNIFICANT CHANGE UP (ref 14–18)
IMM GRANULOCYTES NFR BLD AUTO: 0.8 % — HIGH (ref 0.1–0.3)
LACTATE BLDV-MCNC: 0.8 MMOL/L — SIGNIFICANT CHANGE UP (ref 0.5–2)
LYMPHOCYTES # BLD AUTO: 1.67 K/UL — SIGNIFICANT CHANGE UP (ref 1.2–3.4)
LYMPHOCYTES # BLD AUTO: 22.4 % — SIGNIFICANT CHANGE UP (ref 20.5–51.1)
MCHC RBC-ENTMCNC: 31.7 PG — HIGH (ref 27–31)
MCHC RBC-ENTMCNC: 35.2 G/DL — SIGNIFICANT CHANGE UP (ref 32–37)
MCV RBC AUTO: 90 FL — SIGNIFICANT CHANGE UP (ref 80–94)
MONOCYTES # BLD AUTO: 0.94 K/UL — HIGH (ref 0.1–0.6)
MONOCYTES NFR BLD AUTO: 12.6 % — HIGH (ref 1.7–9.3)
NEUTROPHILS # BLD AUTO: 4.39 K/UL — SIGNIFICANT CHANGE UP (ref 1.4–6.5)
NEUTROPHILS NFR BLD AUTO: 58.7 % — SIGNIFICANT CHANGE UP (ref 42.2–75.2)
NRBC # BLD: 0 /100 WBCS — SIGNIFICANT CHANGE UP (ref 0–0)
NT-PROBNP SERPL-SCNC: 61 PG/ML — SIGNIFICANT CHANGE UP (ref 0–300)
PCO2 BLDV: 42 MMHG — SIGNIFICANT CHANGE UP (ref 42–55)
PH BLDV: 7.44 — HIGH (ref 7.32–7.43)
PLATELET # BLD AUTO: 275 K/UL — SIGNIFICANT CHANGE UP (ref 130–400)
PMV BLD: 8.1 FL — SIGNIFICANT CHANGE UP (ref 7.4–10.4)
PO2 BLDV: 52 MMHG — HIGH (ref 25–45)
POTASSIUM BLDV-SCNC: 3.5 MMOL/L — SIGNIFICANT CHANGE UP (ref 3.5–5.1)
POTASSIUM SERPL-MCNC: 3.5 MMOL/L — SIGNIFICANT CHANGE UP (ref 3.5–5)
POTASSIUM SERPL-SCNC: 3.5 MMOL/L — SIGNIFICANT CHANGE UP (ref 3.5–5)
PROT SERPL-MCNC: 6.8 G/DL — SIGNIFICANT CHANGE UP (ref 6–8)
RBC # BLD: 4.61 M/UL — LOW (ref 4.7–6.1)
RBC # FLD: 13.1 % — SIGNIFICANT CHANGE UP (ref 11.5–14.5)
RSV RNA NPH QL NAA+NON-PROBE: SIGNIFICANT CHANGE UP
SAO2 % BLDV: 88.6 % — HIGH (ref 67–88)
SARS-COV-2 RNA SPEC QL NAA+PROBE: SIGNIFICANT CHANGE UP
SODIUM SERPL-SCNC: 127 MMOL/L — LOW (ref 135–146)
TROPONIN T, HIGH SENSITIVITY RESULT: 15 NG/L — SIGNIFICANT CHANGE UP (ref 6–21)
TROPONIN T, HIGH SENSITIVITY RESULT: 16 NG/L — SIGNIFICANT CHANGE UP (ref 6–21)
WBC # BLD: 7.47 K/UL — SIGNIFICANT CHANGE UP (ref 4.8–10.8)
WBC # FLD AUTO: 7.47 K/UL — SIGNIFICANT CHANGE UP (ref 4.8–10.8)

## 2024-08-26 RX ORDER — LOSARTAN POTASSIUM 50 MG/1
100 TABLET ORAL DAILY
Refills: 0 | Status: DISCONTINUED | OUTPATIENT
Start: 2024-08-26 | End: 2024-08-29

## 2024-08-26 RX ORDER — SODIUM CHLORIDE 9 MG/ML
1000 INJECTION INTRAMUSCULAR; INTRAVENOUS; SUBCUTANEOUS
Refills: 0 | Status: DISCONTINUED | OUTPATIENT
Start: 2024-08-26 | End: 2024-08-29

## 2024-08-26 RX ORDER — HYDRALAZINE HCL 50 MG
25 TABLET ORAL THREE TIMES A DAY
Refills: 0 | Status: DISCONTINUED | OUTPATIENT
Start: 2024-08-26 | End: 2024-08-27

## 2024-08-26 RX ORDER — ENOXAPARIN SODIUM 100 MG/ML
40 INJECTION SUBCUTANEOUS EVERY 24 HOURS
Refills: 0 | Status: DISCONTINUED | OUTPATIENT
Start: 2024-08-26 | End: 2024-08-28

## 2024-08-26 RX ORDER — SODIUM CHLORIDE 9 MG/ML
500 INJECTION INTRAMUSCULAR; INTRAVENOUS; SUBCUTANEOUS ONCE
Refills: 0 | Status: COMPLETED | OUTPATIENT
Start: 2024-08-26 | End: 2024-08-26

## 2024-08-26 RX ORDER — LOSARTAN POTASSIUM 50 MG/1
100 TABLET ORAL DAILY
Refills: 0 | Status: DISCONTINUED | OUTPATIENT
Start: 2024-08-26 | End: 2024-08-26

## 2024-08-26 RX ORDER — IOHEXOL 350 MG/ML
30 INJECTION, SOLUTION INTRAVENOUS ONCE
Refills: 0 | Status: COMPLETED | OUTPATIENT
Start: 2024-08-26 | End: 2024-08-26

## 2024-08-26 RX ORDER — TIOTROPIUM BROMIDE INHALATION SPRAY 3.12 UG/1
2 SPRAY, METERED RESPIRATORY (INHALATION) DAILY
Refills: 0 | Status: DISCONTINUED | OUTPATIENT
Start: 2024-08-26 | End: 2024-08-29

## 2024-08-26 RX ORDER — BUDESONIDE AND FORMOTEROL FUMARATE 80; 4.5 UG/1; UG/1
2 AEROSOL, METERED RESPIRATORY (INHALATION)
Refills: 0 | Status: DISCONTINUED | OUTPATIENT
Start: 2024-08-26 | End: 2024-08-29

## 2024-08-26 RX ADMIN — Medication 100 MILLIGRAM(S): at 19:15

## 2024-08-26 RX ADMIN — ENOXAPARIN SODIUM 40 MILLIGRAM(S): 100 INJECTION SUBCUTANEOUS at 18:23

## 2024-08-26 RX ADMIN — IOHEXOL 30 MILLILITER(S): 350 INJECTION, SOLUTION INTRAVENOUS at 11:18

## 2024-08-26 RX ADMIN — SODIUM CHLORIDE 500 MILLILITER(S): 9 INJECTION INTRAMUSCULAR; INTRAVENOUS; SUBCUTANEOUS at 02:18

## 2024-08-26 RX ADMIN — SODIUM CHLORIDE 75 MILLILITER(S): 9 INJECTION INTRAMUSCULAR; INTRAVENOUS; SUBCUTANEOUS at 19:11

## 2024-08-26 RX ADMIN — Medication 25 MILLIGRAM(S): at 23:36

## 2024-08-26 RX ADMIN — BUDESONIDE AND FORMOTEROL FUMARATE 2 PUFF(S): 80; 4.5 AEROSOL, METERED RESPIRATORY (INHALATION) at 22:25

## 2024-08-26 NOTE — CONSULT NOTE ADULT - NS ATTEND AMEND GEN_ALL_CORE FT
Agree with above    80 minutes of time utilized in the history and physical, review of imaging, medical decision making, discussion with the patient, coordination of care.

## 2024-08-26 NOTE — CONSULT NOTE ADULT - SUBJECTIVE AND OBJECTIVE BOX
NEUROSURGERY CONSULT  MATTIE FERGUSON   08-26-24 @ 09:46    HPI: 74-year-old male past medical history of COPD current smoker, hypertension, "heart problem" presenting to ED for evaluation of shortness of breath and lightheadedness worse with ambulation for the last 2 days. Otherwise denies any fever, chills, headache, changes in vision, cough, congestion, cp, palpitations,  n/v/d, abd pain, constipation, urinary complaints, lower extremity pain/swelling.    RADIOLOGY:   < from: CT Head No Cont (08.26.24 @ 05:44) >  Impression:  Right middle cranial fossa questionable isodense 3.4 cm mass, not present   January 2021; possible noncalcified meningioma. Apparent mass effect on   subjacent temporal lobe. Further evaluation recommended with MR brain   with and without IV contrast.  No evidence of acute intracranial abnormality.    PAST MEDICAL & SURGICAL HISTORY:  Hypertension  COPD, mild  Thoracic aortic aneurysm  Chronic hyponatremia  Emphysema/COPD    No significant past surgical history        FAMILY HISTORY:  FH: hypertension        SOCIAL HISTORY:  Tobacco Use:  EtOH use:   Substance:    Allergies    No Known Allergies    Intolerances        [X] A ten-point review of systems is negative except as noted   [  ] Due to altered mental status/intubation, subjective information were not able to be obtained from the patient. History was obtained, to the extent possible, from review of the chart and collateral sources of information    MEDS:      PHYSICAL EXAM:  GENERAL: NAD, speaks in full sentences, no signs of respiratory distress  HEAD:  Atraumatic, Normocephalic  NECK: Supple, No JVD  CHEST/LUNG: Clear to auscultation bilaterally; No wheeze; No crackles; No accessory muscles used  HEART: Regular rate and rhythm;   ABDOMEN: Soft, Nontender, Nondistended; Bowel sounds present; No guarding  EXTREMITIES:  2+ Peripheral Pulses, No cyanosis or edema  MSK: Left sided lumbar tenderness to palpation    NEUROLOGICAL EXAM     Vital Signs Last 24 Hrs  T(C): 35.9 (26 Aug 2024 08:14), Max: 36.6 (26 Aug 2024 00:18)  T(F): 96.7 (26 Aug 2024 08:14), Max: 97.8 (26 Aug 2024 00:18)  HR: 68 (26 Aug 2024 08:14) (68 - 71)  BP: 175/103 (26 Aug 2024 08:14) (175/103 - 188/105)  BP(mean): --  RR: 18 (26 Aug 2024 08:14) (18 - 18)  SpO2: 97% (26 Aug 2024 08:14) (97% - 99%)    Parameters below as of 26 Aug 2024 08:14  Patient On (Oxygen Delivery Method): room air          LABS:                        14.6   7.47  )-----------( 275      ( 26 Aug 2024 01:23 )             41.5     08-26    127<L>  |  91<L>  |  20  ----------------------------<  138<H>  3.5   |  24  |  0.9    Ca    8.9      26 Aug 2024 01:23    TPro  6.8  /  Alb  4.4  /  TBili  0.4  /  DBili  x   /  AST  14  /  ALT  19  /  AlkPhos  52  08-26               NEUROSURGERY CONSULT  MATTIE FERGUSON   08-26-24 @ 09:46    HPI: 74-year-old male past medical history of COPD current smoker, hypertension, "heart problem" presenting to ED for evaluation of shortness of breath and lightheadedness worse with ambulation for the last 2 days. Otherwise denies any fever, chills, headache, changes in vision, cough, congestion, cp, palpitations,  n/v/d, abd pain, constipation, urinary complaints, lower extremity pain/swelling.    RADIOLOGY:   < from: CT Head No Cont (08.26.24 @ 05:44) >  Impression:  Right middle cranial fossa questionable isodense 3.4 cm mass, not present   January 2021; possible noncalcified meningioma. Apparent mass effect on   subjacent temporal lobe. Further evaluation recommended with MR brain   with and without IV contrast.  No evidence of acute intracranial abnormality.    PAST MEDICAL & SURGICAL HISTORY:  Hypertension  COPD, mild  Thoracic aortic aneurysm  Chronic hyponatremia  Emphysema/COPD    No significant past surgical history    FAMILY HISTORY:  FH: hypertension    SOCIAL HISTORY:  Tobacco Use:  EtOH use:   Substance:    Allergies    No Known Allergies    Intolerances        [X] A ten-point review of systems is negative except as noted   [  ] Due to altered mental status/intubation, subjective information were not able to be obtained from the patient. History was obtained, to the extent possible, from review of the chart and collateral sources of information    MEDS:      PHYSICAL EXAM:  Awake, alert, following commands, Ox3   Pupils reactive, EOMI  Face symmetrical  Facial sensation intact       Vital Signs Last 24 Hrs  T(C): 35.9 (26 Aug 2024 08:14), Max: 36.6 (26 Aug 2024 00:18)  T(F): 96.7 (26 Aug 2024 08:14), Max: 97.8 (26 Aug 2024 00:18)  HR: 68 (26 Aug 2024 08:14) (68 - 71)  BP: 175/103 (26 Aug 2024 08:14) (175/103 - 188/105)  BP(mean): --  RR: 18 (26 Aug 2024 08:14) (18 - 18)  SpO2: 97% (26 Aug 2024 08:14) (97% - 99%)    Parameters below as of 26 Aug 2024 08:14  Patient On (Oxygen Delivery Method): room air          LABS:                        14.6   7.47  )-----------( 275      ( 26 Aug 2024 01:23 )             41.5     08-26    127<L>  |  91<L>  |  20  ----------------------------<  138<H>  3.5   |  24  |  0.9    Ca    8.9      26 Aug 2024 01:23    TPro  6.8  /  Alb  4.4  /  TBili  0.4  /  DBili  x   /  AST  14  /  ALT  19  /  AlkPhos  52  08-26               NEUROSURGERY CONSULT  MATTIE FERGUSON   08-26-24 @ 09:46    HPI: 74-year-old male past medical history of COPD current smoker, hypertension, "heart problem" presenting to ED for evaluation of shortness of breath and lightheadedness worse with ambulation for the last 2 days. Otherwise denies any fever, chills, headache, changes in vision, cough, congestion, cp, palpitations,  n/v/d, abd pain, constipation, urinary complaints, lower extremity pain/swelling.    Neurosurgery was consulted for patient p/w SOB and near syncopal episodes for the past x 2 days CTH with R 3.4cm temporal mass, poss noncalcified meningioma. Patient seen and examined at bedside with wife using [employer]  [facility] #[id number]. Patient states that he has been having dizziness and SOB for the past couple of days. + current smoker. Denies currently any HA, N/V, blurry vision, changes in vision, weakness in UE & LE. CTA chest done showing L apical tumor, poss Pancoast tumor.     RADIOLOGY:   < from: CT Head No Cont (08.26.24 @ 05:44) >  Impression:  Right middle cranial fossa questionable isodense 3.4 cm mass, not present   January 2021; possible noncalcified meningioma. Apparent mass effect on   subjacent temporal lobe. Further evaluation recommended with MR brain   with and without IV contrast.  No evidence of acute intracranial abnormality.    PAST MEDICAL & SURGICAL HISTORY:  Hypertension  COPD, mild  Thoracic aortic aneurysm  Chronic hyponatremia  Emphysema/COPD    No significant past surgical history    FAMILY HISTORY:  FH: hypertension    SOCIAL HISTORY:  Tobacco Use:  EtOH use:   Substance:    Allergies    No Known Allergies    Intolerances        [X] A ten-point review of systems is negative except as noted   [  ] Due to altered mental status/intubation, subjective information were not able to be obtained from the patient. History was obtained, to the extent possible, from review of the chart and collateral sources of information    MEDS:      PHYSICAL EXAM:  Awake, alert, following commands, Ox3   Pupils reactive, EOMI  Face symmetrical  Facial sensation intact   MAEx4 with full and equal strength   No drift   SILT   No dysmetria     Vital Signs Last 24 Hrs  T(C): 35.9 (26 Aug 2024 08:14), Max: 36.6 (26 Aug 2024 00:18)  T(F): 96.7 (26 Aug 2024 08:14), Max: 97.8 (26 Aug 2024 00:18)  HR: 68 (26 Aug 2024 08:14) (68 - 71)  BP: 175/103 (26 Aug 2024 08:14) (175/103 - 188/105)  BP(mean): --  RR: 18 (26 Aug 2024 08:14) (18 - 18)  SpO2: 97% (26 Aug 2024 08:14) (97% - 99%)    Parameters below as of 26 Aug 2024 08:14  Patient On (Oxygen Delivery Method): room air          LABS:                        14.6   7.47  )-----------( 275      ( 26 Aug 2024 01:23 )             41.5     08-26    127<L>  |  91<L>  |  20  ----------------------------<  138<H>  3.5   |  24  |  0.9    Ca    8.9      26 Aug 2024 01:23    TPro  6.8  /  Alb  4.4  /  TBili  0.4  /  DBili  x   /  AST  14  /  ALT  19  /  AlkPhos  52  08-26

## 2024-08-26 NOTE — H&P ADULT - ATTENDING COMMENTS
Patient seen and examined at bedside independently of the residents. I read the resident's note and agree with the plan with the additions and corrections as noted below. My note supersedes the resident's note.     REVIEW OF SYSTEMS:  Negative except in HPI.     PMH:  HTN, COPD/Emphysema, Active smoker and TAA, Hyponatremia, Mild non obstructive CAD and Left sided Schwannoma (diagnosed by PET scan, last PET shows 3.1 cm in 2023).    FHx: Reviewed. No fhx of asthma/copd, No fhx of liver and pulmonary disease. No fhx of hematological disorder.     Physical Exam:  GEN: No acute distress. Awake, Alert and oriented x 3.   Head: Atraumatic, Normocephalic.   Eye: PEERLA. No sclera icterus. EOMI.   ENT: Normal oropharynx, no thyromegaly, no mass, no lymphadenopathy.   LUNGS: Clear to auscultation bilaterally. No wheeze/rales/crackles.   HEART: Normal. S1/S2 present. RRR. No murmur/gallops.   ABD: Soft, non-tender, non-distended. Bowel sounds present.   EXT: No pitting edema. No erythema. No tenderness.  Integumentary: No rash, No sore, No petechia.   NEURO: CN III-XII intact. Strength: 5/5 b/l ULE. Sensory intact b/l ULE.     Vital Signs Last 24 Hrs  T(C): 35.9 (26 Aug 2024 08:14), Max: 36.6 (26 Aug 2024 00:18)  T(F): 96.7 (26 Aug 2024 08:14), Max: 97.8 (26 Aug 2024 00:18)  HR: 68 (26 Aug 2024 08:14) (68 - 71)  BP: 175/103 (26 Aug 2024 08:14) (175/103 - 188/105)  BP(mean): --  RR: 18 (26 Aug 2024 08:14) (18 - 18)  SpO2: 97% (26 Aug 2024 08:14) (97% - 99%)    Parameters below as of 26 Aug 2024 08:14  Patient On (Oxygen Delivery Method): room air    Please see the above notes for Labs and radiology.     Assessment and Plan:     71 yo M with hx of HTN, COPD/Emphysema, Active smoker and TAA, Hyponatremia, Mild non obstructive CAD and Left sided Schwannoma (dx by PET scan, never had biopsy) presents to ED for SOB and lightheadedness x 2 days. Spoke with Dr. Vick (PMD) who reports that patient had PET scan done in 2023 which shows 3.1 cm Schwannoma at T1-T2 level (never dx with biopsy).    Meningioma   - CTH: Right middle cranial fossa questionable isodense 3.4 cm mass, not present 2021; possible noncalcified meningioma. Apparent mass effect on subjacent temporal lobe.   - s/p eval by Neurosurgery team in ED.   - MRI brain w/wo contrast shows Dural-based enhancing mass arising from the anterior wall of the right middle cranial fossa measuring up to 3.7 cm with prominent dural thickening/enhancement over the right frontal and temporal convexities. No significant peritumoral edema. The lesion likely reflects a meningioma, however is significantly increased in size since the CT head 21 (retrospectively measured about 1.4 cm on that exam).  - f/u CTA H & N   - No need for keppra as per NSx.   - f/u NeuroSx    Left lung mass 2/2 Schwannoma vs. less likely primary lung lesion  - CTA chest: Neg for acute PE. Left apical 4 x 3 cm mass, with possible continuation to upper thoracic neuroforamen; neurogenic mass is a consideration in addition to lung Pancoast tumor in patient with emphysema.   - MRI Thoracic shows Redemonstrated well marginated mass at the left medial lung apex measuring about 4 cm.  It demonstrates extension into the left T1-2 neural foramen favoring a neurogenic lesion (such as schwannoma) over primary lung lesion.   - will get CT abdomen/pelvic w/wo contrast for metastatic w/u  - May need tissue sampling with biopsy as the lesion increased in size. Will get IR eval for biopsy.   - get outpatient CT/PET scan report from PMD (Dr. Vick).     T4 vertebral lesion  - MRI thoracic shows Nonspecific heterogeneous marrow signal, with questionable focal lesion within the T4 vertebral body measuring about 1.8 cm (vs artifact).   - will get Bone scan    Thyroid lesion   - MRI shows Partially visualized right thyroid lesion.  - check thyroid US   - check TSH, FT4.    Chronic Hyponatremia  - serum Na 127 currently. Serum Na 124-131 in .  - check serum osmolarity, urine osmolarity, urine electrolytes   - check TSH, AM cortisol  - repeat BMP    COPD/Emphysema - c/w home inhalers.   HTN - c/w home med.   Active smoker - nicotine patch prn    DVT ppx: Lovenox SC  GI ppx: PPI  Diet: DASH   Activity: as tolerated.     Date seen by the attendin2024  Total Time spent: 60 minutes. Patient seen and examined at bedside independently of the residents. I read the resident's note and agree with the plan with the additions and corrections as noted below. My note supersedes the resident's note.     REVIEW OF SYSTEMS:  Negative except in HPI.     PMH:  HTN, COPD/Emphysema, Active smoker and TAA, Hyponatremia, Mild non obstructive CAD and Left sided Schwannoma (diagnosed by PET scan, last PET shows 3.1 cm in 2023).    FHx: Reviewed. No fhx of asthma/copd, No fhx of liver and pulmonary disease. No fhx of hematological disorder.     Physical Exam:  GEN: No acute distress. Awake, Alert and oriented x 3.   Head: Atraumatic, Normocephalic.   Eye: PEERLA. No sclera icterus. EOMI.   ENT: Normal oropharynx, no thyromegaly, no mass, no lymphadenopathy.   LUNGS: Clear to auscultation bilaterally. No wheeze/rales/crackles.   HEART: Normal. S1/S2 present. RRR. No murmur/gallops.   ABD: Soft, non-tender, non-distended. Bowel sounds present.   EXT: No pitting edema. No erythema. No tenderness.  Integumentary: No rash, No sore, No petechia.   NEURO: CN III-XII intact. Strength: 5/5 b/l ULE. Sensory intact b/l ULE.     Vital Signs Last 24 Hrs  T(C): 35.9 (26 Aug 2024 08:14), Max: 36.6 (26 Aug 2024 00:18)  T(F): 96.7 (26 Aug 2024 08:14), Max: 97.8 (26 Aug 2024 00:18)  HR: 68 (26 Aug 2024 08:14) (68 - 71)  BP: 175/103 (26 Aug 2024 08:14) (175/103 - 188/105)  BP(mean): --  RR: 18 (26 Aug 2024 08:14) (18 - 18)  SpO2: 97% (26 Aug 2024 08:14) (97% - 99%)    Parameters below as of 26 Aug 2024 08:14  Patient On (Oxygen Delivery Method): room air    Please see the above notes for Labs and radiology.     Assessment and Plan:     71 yo M with hx of HTN, COPD/Emphysema, Active smoker and TAA, Hyponatremia, Mild non obstructive CAD and Left sided Schwannoma (dx by PET scan, never had biopsy) presents to ED for SOB and lightheadedness x 2 days. Spoke with Dr. Vick (PMD) who reports that patient had PET scan done in 2023 which shows 3.1 cm Schwannoma at T1-T2 level (never dx with biopsy).    Meningioma   - CTH: Right middle cranial fossa questionable isodense 3.4 cm mass, not present 2021; possible noncalcified meningioma. Apparent mass effect on subjacent temporal lobe.   - s/p eval by Neurosurgery team in ED.   - MRI brain w/wo contrast shows Dural-based enhancing mass arising from the anterior wall of the right middle cranial fossa measuring up to 3.7 cm with prominent dural thickening/enhancement over the right frontal and temporal convexities. No significant peritumoral edema. The lesion likely reflects a meningioma, however is significantly increased in size since the CT head 21 (retrospectively measured about 1.4 cm on that exam).  - f/u CTA H & N   - No need for keppra as per NSx.   - f/u NeuroSx    Left lung mass 2/2 Schwannoma vs. less likely primary lung lesion  - CTA chest: Neg for acute PE. Left apical 4 x 3 cm mass, with possible continuation to upper thoracic neuroforamen; neurogenic mass is a consideration in addition to lung Pancoast tumor in patient with emphysema.   - MRI Thoracic shows Redemonstrated well marginated mass at the left medial lung apex measuring about 4 cm.  It demonstrates extension into the left T1-2 neural foramen favoring a neurogenic lesion (such as schwannoma) over primary lung lesion.   - will get CT abdomen/pelvic w/wo contrast for metastatic w/u  - May need tissue sampling with biopsy as the lesion increased in size. Will get IR eval for biopsy.   - get outpatient CT/PET scan report from [facility]D (Dr. Vick).     T4 vertebral lesion  - MRI thoracic shows Nonspecific heterogeneous marrow signal, with questionable focal lesion within the T4 vertebral body measuring about 1.8 cm (vs artifact).   - will get Bone scan    Thyroid lesion   - MRI shows Partially visualized right thyroid lesion.  - check thyroid US   - check TSH, FT4.    Chronic Hyponatremia  - serum Na 127 currently. Serum Na 124-131 in .  - check serum osmolarity, urine osmolarity, urine electrolytes, TSH, AM cortisol  - Hold HCTZ   - repeat BMP    COPD/Emphysema - c/w home inhalers.   HTN - c/w home med.   Active smoker - nicotine patch prn    DVT ppx: Lovenox SC  GI ppx: PPI  Diet: DASH   Activity: as tolerated.     Date seen by the attendin2024  Total Time spent: 60 minutes. Patient seen and examined at bedside independently of the residents. I read the resident's note and agree with the plan with the additions and corrections as noted below. My note supersedes the resident's note.     REVIEW OF SYSTEMS:  Negative except in HPI.     PMH:  HTN, COPD/Emphysema, Active smoker and TAA, Hyponatremia, Mild non obstructive CAD and Left sided Schwannoma (diagnosed by PET scan, last PET shows 3.1 cm in 2023).    FHx: Reviewed. No fhx of asthma/copd, No fhx of liver and pulmonary disease. No fhx of hematological disorder.     Physical Exam:  GEN: No acute distress. Awake, Alert and oriented x 3.   Head: Atraumatic, Normocephalic.   Eye: PEERLA. No sclera icterus. EOMI.   ENT: Normal oropharynx, no thyromegaly, no mass, no lymphadenopathy.   LUNGS: Clear to auscultation bilaterally. No wheeze/rales/crackles.   HEART: Normal. S1/S2 present. RRR. No murmur/gallops.   ABD: Soft, non-tender, non-distended. Bowel sounds present.   EXT: No pitting edema. No erythema. No tenderness.  Integumentary: No rash, No sore, No petechia.   NEURO: CN III-XII intact. Strength: 5/5 b/l ULE. Sensory intact b/l ULE.     Vital Signs Last 24 Hrs  T(C): 35.9 (26 Aug 2024 08:14), Max: 36.6 (26 Aug 2024 00:18)  T(F): 96.7 (26 Aug 2024 08:14), Max: 97.8 (26 Aug 2024 00:18)  HR: 68 (26 Aug 2024 08:14) (68 - 71)  BP: 175/103 (26 Aug 2024 08:14) (175/103 - 188/105)  BP(mean): --  RR: 18 (26 Aug 2024 08:14) (18 - 18)  SpO2: 97% (26 Aug 2024 08:14) (97% - 99%)    Parameters below as of 26 Aug 2024 08:14  Patient On (Oxygen Delivery Method): room air    Please see the above notes for Labs and radiology.     Assessment and Plan:     71 yo M with hx of HTN, COPD/Emphysema, Active smoker and TAA, Hyponatremia, Mild non obstructive CAD and Left sided Schwannoma (dx by PET scan, never had biopsy) presents to ED for SOB and lightheadedness x 2 days. Spoke with Dr. Vick (PMD) who reports that patient had PET scan done in 2023 which shows 3.1 cm Schwannoma at T1-T2 level (never dx with biopsy).    Meningioma   - CTH: Right middle cranial fossa questionable isodense 3.4 cm mass, not present 2021; possible noncalcified meningioma. Apparent mass effect on subjacent temporal lobe.   - s/p eval by Neurosurgery team in ED.   - MRI brain w/wo contrast shows Dural-based enhancing mass arising from the anterior wall of the right middle cranial fossa measuring up to 3.7 cm with prominent dural thickening/enhancement over the right frontal and temporal convexities. No significant peritumoral edema. The lesion likely reflects a meningioma, however is significantly increased in size since the CT head 21 (retrospectively measured about 1.4 cm on that exam).  - f/u CTA H & N   - No need for keppra as per NSx.   - f/u NeuroSx    Left lung mass 2/2 Schwannoma vs. less likely primary lung lesion  - CTA chest: Neg for acute PE. Left apical 4 x 3 cm mass, with possible continuation to upper thoracic neuroforamen; neurogenic mass is a consideration in addition to lung Pancoast tumor in patient with emphysema.   - MRI Thoracic shows Redemonstrated well marginated mass at the left medial lung apex measuring about 4 cm.  It demonstrates extension into the left T1-2 neural foramen favoring a neurogenic lesion (such as schwannoma) over primary lung lesion.   - will get CT abdomen/pelvic w/wo contrast for metastatic w/u  - May need tissue sampling with biopsy as the lesion increased in size. Will get IR eval for biopsy.   - get outpatient CT/PET scan report from [facility] (Dr. Vick).     T4 vertebral lesion  - MRI thoracic shows Nonspecific heterogeneous marrow signal, with questionable focal lesion within the T4 vertebral body measuring about 1.8 cm (vs artifact).   - will get Bone scan    Thyroid lesion   - MRI shows Partially visualized right thyroid lesion.  - check thyroid US   - check TSH, FT4.    Chronic Hyponatremia  - serum Na 127 currently. Serum Na 124-131 in .  - check serum osmolarity, urine osmolarity, urine electrolytes, TSH, AM cortisol  - Hold HCTZ   - repeat BMP    COPD/Emphysema - c/w home inhalers.   HTN - c/w home med.   Active smoker - nicotine patch prn    DVT ppx: Lovenox SC  GI ppx: not indicated.  Diet: DASH diet  Activity: as tolerated.     Date seen by the attendin2024  Total Time spent: 60 minutes.

## 2024-08-26 NOTE — CONSULT NOTE ADULT - SUBJECTIVE AND OBJECTIVE BOX
GENERAL SURGERY CONSULT NOTE    Patient: MATTIE FERGUSON , 74y ([birth year])Male   MRN: [id number]  Location: [facility] ED Hold 036 A  Visit: 08-26-24 Inpatient  Date: 08-26-24 @ 19:26    HPI:  Pt is 74M PMHx COPD, tobacco use (>100 pack years), HTN, and L temporal mass (6/2023 PET showed 3.1cm, schwannoma vs non-calcified meningioma) who presented to ED on 8/26 with 2 days dizziness and difficulty with ambulation. Denies fevers, chills, CP, SOB, N/V, or changes in vision. CTA chest demonstrated     PAST MEDICAL & SURGICAL HISTORY:  Hypertension  COPD, mild  Thoracic aortic aneurysm  Chronic hyponatremia  Emphysema/COPD  No significant past surgical history    Home Medications:  albuterol 2.5 mg/0.5 mL (0.5%) inhalation solution: 0.5 milliliter(s) by nebulizer once a day as needed for  shortness of breath and/or wheezing (26 Aug 2024 16:59)  hydrALAZINE 25 mg oral tablet: 1 tab(s) orally 3 times a day (26 Aug 2024 16:58)  hydroCHLOROthiazide 25 mg oral tablet: 1 tab(s) orally once a day (26 Aug 2024 16:59)  Trelegy Ellipta 100 mcg-62.5 mcg-25 mcg/inh inhalation powder: 1 puff(s) inhaled once a day (26 Aug 2024 16:59)    VITALS:  T(F): 98.2 (08-26-24 @ 19:03), Max: 98.2 (08-26-24 @ 19:03)  HR: 85 (08-26-24 @ 19:03) (68 - 85)  BP: 147/82 (08-26-24 @ 19:03) (147/82 - 188/105)  RR: 16 (08-26-24 @ 19:03) (16 - 18)  SpO2: 99% (08-26-24 @ 19:03) (97% - 99%)    PHYSICAL EXAM:  General: NAD, AAOx3, calm and cooperative  HEENT: NCAT, CHRISTEN, EOMI, Trachea ML, Neck supple  Cardiac: RRR S1, S2, no Murmurs, rubs or gallops  Respiratory: CTAB, normal respiratory effort, breath sounds equal BL, no wheeze, rhonchi or crackles  Abdomen: Soft, non-distended, non-tender, no rebound, no guarding. +BS.  Rectal: Good tone, +stool, no blood, no lucio-anal masses/lesions, no fistulas, fissures, hemorrhoids  Musculoskeletal: Strength 5/5 BL UE/LE, ROM intact, compartments soft  Neuro: Sensation grossly intact and equal throughout, no focal deficits  Vascular: Pulses 2+ throughout, extremities well perfused  Skin: Warm/dry, normal color, no jaundice  Incision/wound: healing well, dressings in place, clean, dry and intact    MEDICATIONS  (STANDING):  budesonide  80 MICROgram(s)/formoterol 4.5 MICROgram(s) Inhaler 2 Puff(s) Inhalation two times a day  enoxaparin Injectable 40 milliGRAM(s) SubCutaneous every 24 hours  hydrALAZINE 25 milliGRAM(s) Oral three times a day  labetalol 100 milliGRAM(s) Oral two times a day  losartan 100 milliGRAM(s) Oral daily  sodium chloride 0.9%. 1000 milliLiter(s) (75 mL/Hr) IV Continuous <Continuous>  tiotropium 2.5 MICROgram(s) Inhaler 2 Puff(s) Inhalation daily    MEDICATIONS  (PRN):      LAB/STUDIES:                        14.6   7.47  )-----------( 275      ( 26 Aug 2024 01:23 )             41.5     08-26    127<L>  |  91<L>  |  20  ----------------------------<  138<H>  3.5   |  24  |  0.9    Ca    8.9      26 Aug 2024 01:23    TPro  6.8  /  Alb  4.4  /  TBili  0.4  /  DBili  x   /  AST  14  /  ALT  19  /  AlkPhos  52  08-26      LIVER FUNCTIONS - ( 26 Aug 2024 01:23 )  Alb: 4.4 g/dL / Pro: 6.8 g/dL / ALK PHOS: 52 U/L / ALT: 19 U/L / AST: 14 U/L / GGT: x           Urinalysis Basic - ( 26 Aug 2024 01:23 )    Color: x / Appearance: x / SG: x / pH: x  Gluc: 138 mg/dL / Ketone: x  / Bili: x / Urobili: x   Blood: x / Protein: x / Nitrite: x   Leuk Esterase: x / RBC: x / WBC x   Sq Epi: x / Non Sq Epi: x / Bacteria: x    IMAGING:  ACC: [id number] EXAM:  CT ANGIO CHEST PULM [facility]   ORDERED BY: BRIGIDA ROQUE     PROCEDURE DATE:  08/26/2024          INTERPRETATION:  CLINICAL HISTORY/REASON FOR EXAM: Shortness of breath.    TECHNIQUE: Multislice helical sections were obtained from the thoracic   inlet to the lung bases during rapid administration of 65 cc Omnipaque   350 intravenous contrast. Thin sections were reconstructed through the   pulmonary vasculature. 3D (MIP) reformats obtained. 35 cc contrast   discarded    COMPARISON: None.    FINDINGS:    PULMONARY EMBOLUS: No evidence of acute pulmonary embolism.    LUNGS, PLEURA, AIRWAYS:    Left apical 4 x 3 cm mass, with possible continuation to upper thoracic   neuroforamen; neurogenic mass is a consideration in addition to lung   Pancoast tumor (series 7, image 21)    No lobar consolidation, effusion, or pneumothorax. No evidence of central   endobronchial obstruction. No bronchiectasis or honeycombing.   Centrilobular and paraseptal emphysema. Bilateral subsegmental   atelectasis.    THORACIC NODES: No mediastinal, hilar, supraclavicular, or axillary   lymphadenopathy.    MEDIASTINUM/GREAT VESSELS: No pericardial effusion. Heart size is within   normal limits. Mild aneurysmal dilation of ascending thoracic aorta up to   4.2 cm.. Aortic calcifications.    BONES/SOFT TISSUES: Unremarkable.    VISUALIZED UPPER ABDOMEN: Unremarkable.      IMPRESSION:    Left apical 4 x 3 cm mass, with possible continuation to upper thoracic   neuroforamen; neurogenic mass is a consideration in addition to lung   Pancoast tumor in patient with emphysema. MR thoracic spine with IV   contrast is recommended for initial evaluation.    No evidence of acute thoracic pathology or pulmonary embolism.    ACCESS DEVICES:  [ ] Peripheral IV  [ ] Central Venous Line	[ ] R	[ ] L	[ ] IJ	[ ] Fem	[ ] SC	Placed:   [ ] Arterial Line		[ ] R	[ ] L	[ ] Fem	[ ] Rad	[ ] Ax	Placed:   [ ] PICC:					[ ] Mediport  [ ] Urinary Catheter, Date Placed:     ASSESSMENT:  74yM w/ PMHx of  ***  who presented with ***. Physical exam findings, imaging, and labs as documented above.     PLAN:  -  -  -    Lines/Tubes: PIV, Midline, Central Line, A-Line, Chest tubes, Edgar/ROME drains, Glez Catheter.    Above plan discussed with Attending Surgeon  ***  , patient, patient family, and Primary team  08-26-24 @ 19:26   GENERAL SURGERY CONSULT NOTE    Patient: MATTIE FERGUSON , 74y ([birth year])Male   MRN: [id number]  Location: [facility] ED Hold 036 A  Visit: 08-26-24 Inpatient  Date: 08-26-24 @ 19:26    HPI:  Pt is 74M PMHx COPD, tobacco use (>100 pack years), HTN, and L temporal mass (6/2023 PET showed 3.1cm, schwannoma vs non-calcified meningioma) who presented to ED on 8/26 with 2 days dizziness and difficulty with ambulation. Denies fevers, chills, CP, SOB, N/V, or changes in vision. CTA chest demonstrated a L apical 4 x 3 cm mass, with possible continuation to upper thoracic neuroforamen with c/f neurogenic mass vs pancoast tumor. Thoracic surgery consulted for evaluation for mass biopsy.      PAST MEDICAL & SURGICAL HISTORY:  Hypertension  COPD, mild  Thoracic aortic aneurysm  Chronic hyponatremia  Emphysema/COPD  No significant past surgical history    Home Medications:  albuterol 2.5 mg/0.5 mL (0.5%) inhalation solution: 0.5 milliliter(s) by nebulizer once a day as needed for  shortness of breath and/or wheezing (26 Aug 2024 16:59)  hydrALAZINE 25 mg oral tablet: 1 tab(s) orally 3 times a day (26 Aug 2024 16:58)  hydroCHLOROthiazide 25 mg oral tablet: 1 tab(s) orally once a day (26 Aug 2024 16:59)  Trelegy Ellipta 100 mcg-62.5 mcg-25 mcg/inh inhalation powder: 1 puff(s) inhaled once a day (26 Aug 2024 16:59)    VITALS:  T(F): 98.2 (08-26-24 @ 19:03), Max: 98.2 (08-26-24 @ 19:03)  HR: 85 (08-26-24 @ 19:03) (68 - 85)  BP: 147/82 (08-26-24 @ 19:03) (147/82 - 188/105)  RR: 16 (08-26-24 @ 19:03) (16 - 18)  SpO2: 99% (08-26-24 @ 19:03) (97% - 99%)    PHYSICAL EXAM:  GENERAL: A&Ox3, NAD  HEENT: Normocephalic, atraumatic, cervical ROM intact, no cervical lymphadenopathy  PULM: Non-labored respirations, bilateral chest rise, saturating well on RA, bilateral breath sounds  CV: Regular rate and rhythm  ACBDOMEN: Abdomen soft, non-distended, non-tender, no rebound tenderness or guarding  MSK: Moving extremities spontaneously, BUE and BLE sensorimotor and strength intact  SKIN: Warm, dry, normal skin color, texture, turgor    MEDICATIONS  (STANDING):  budesonide  80 MICROgram(s)/formoterol 4.5 MICROgram(s) Inhaler 2 Puff(s) Inhalation two times a day  enoxaparin Injectable 40 milliGRAM(s) SubCutaneous every 24 hours  hydrALAZINE 25 milliGRAM(s) Oral three times a day  labetalol 100 milliGRAM(s) Oral two times a day  losartan 100 milliGRAM(s) Oral daily  sodium chloride 0.9%. 1000 milliLiter(s) (75 mL/Hr) IV Continuous <Continuous>  tiotropium 2.5 MICROgram(s) Inhaler 2 Puff(s) Inhalation daily    MEDICATIONS  (PRN):      LAB/STUDIES:                        14.6   7.47  )-----------( 275      ( 26 Aug 2024 01:23 )             41.5     08-26    127<L>  |  91<L>  |  20  ----------------------------<  138<H>  3.5   |  24  |  0.9    Ca    8.9      26 Aug 2024 01:23    TPro  6.8  /  Alb  4.4  /  TBili  0.4  /  DBili  x   /  AST  14  /  ALT  19  /  AlkPhos  52  08-26      LIVER FUNCTIONS - ( 26 Aug 2024 01:23 )  Alb: 4.4 g/dL / Pro: 6.8 g/dL / ALK PHOS: 52 U/L / ALT: 19 U/L / AST: 14 U/L / GGT: x           Urinalysis Basic - ( 26 Aug 2024 01:23 )    Color: x / Appearance: x / SG: x / pH: x  Gluc: 138 mg/dL / Ketone: x  / Bili: x / Urobili: x   Blood: x / Protein: x / Nitrite: x   Leuk Esterase: x / RBC: x / WBC x   Sq Epi: x / Non Sq Epi: x / Bacteria: x    IMAGING:  ACC: [id number] EXAM:  CT ANGIO CHEST PULM [facility]   ORDERED BY: BRIGIDA ROQUE     PROCEDURE DATE:  08/26/2024          INTERPRETATION:  CLINICAL HISTORY/REASON FOR EXAM: Shortness of breath.    TECHNIQUE: Multislice helical sections were obtained from the thoracic   inlet to the lung bases during rapid administration of 65 cc Omnipaque   350 intravenous contrast. Thin sections were reconstructed through the   pulmonary vasculature. 3D (MIP) reformats obtained. 35 cc contrast   discarded    COMPARISON: None.    FINDINGS:    PULMONARY EMBOLUS: No evidence of acute pulmonary embolism.    LUNGS, PLEURA, AIRWAYS:    Left apical 4 x 3 cm mass, with possible continuation to upper thoracic   neuroforamen; neurogenic mass is a consideration in addition to lung   Pancoast tumor (series 7, image 21)    No lobar consolidation, effusion, or pneumothorax. No evidence of central   endobronchial obstruction. No bronchiectasis or honeycombing.   Centrilobular and paraseptal emphysema. Bilateral subsegmental   atelectasis.    THORACIC NODES: No mediastinal, hilar, supraclavicular, or axillary   lymphadenopathy.    MEDIASTINUM/GREAT VESSELS: No pericardial effusion. Heart size is within   normal limits. Mild aneurysmal dilation of ascending thoracic aorta up to   4.2 cm.. Aortic calcifications.    BONES/SOFT TISSUES: Unremarkable.    VISUALIZED UPPER ABDOMEN: Unremarkable.      IMPRESSION:    Left apical 4 x 3 cm mass, with possible continuation to upper thoracic   neuroforamen; neurogenic mass is a consideration in addition to lung   Pancoast tumor in patient with emphysema. MR thoracic spine with IV   contrast is recommended for initial evaluation.    No evidence of acute thoracic pathology or pulmonary embolism.

## 2024-08-26 NOTE — CONSULT NOTE ADULT - ASSESSMENT
74M with Hx COPD, smoker, HTN p/w SOB and DESHPANDE when ambulating with CTH showing R temporal noncalcified mass     PLAN   - MRI brain w/wo natali brain lab protocol   - CTA Head and neck w/wo   - Mets work up: pending CT AP   - Medicine / Pulm work up   - Oncology consult   - No need for Keppra for now   - Discussed case with attending  74M with Hx COPD, smoker, HTN p/w SOB and DESHPANDE when ambulating with CTH showing R temporal noncalcified mass     PLAN   - MRI brain w/wo natali brain lab protocol   - CTA Head and neck w/wo   - Mets work up: pending CT AP   - Medicine / Pulm work up   - CT surgery for evaluation of pancoast tumor   - Oncology consult   - No need for Keppra for now   - Discussed case with attending

## 2024-08-26 NOTE — ED PROVIDER NOTE - CLINICAL SUMMARY MEDICAL DECISION MAKING FREE TEXT BOX
Chest and brain mass.  Requires inpatient hospitalization for workup.  Neurosurgery evaluation already completed.  Stable for floor at this time.

## 2024-08-26 NOTE — ED ADULT TRIAGE NOTE - INTERPRETER'S NAME
Partially impaired: cannot see medication labels or newsprint, but can see obstacles in path, and the surrounding layout; can count fingers at arm's length
[id number]

## 2024-08-26 NOTE — ED PROVIDER NOTE - PROGRESS NOTE DETAILS
pk: Labs chest x-ray reviewed, bedside sonogram shows no signs of pulmonary edema or decreased ejection fraction.  CTA chest added on to rule out pulmonary embolism.  Will sign out to morning team. Authored by Dr. Srinivas Arreguin ct scan noted, neurosx consulted Authored by Dr. Srinivas Arreguin s.mera provided to dr. sanchez pending neurosx and dispo Authored by Dr. Bauman: Patient signed out to me by Dr. Arreguin at 7 AM Patient with dizziness.  X 2 days.  CT scan with mass both chest and head with mild shift.  Pending neurosurgical evaluation

## 2024-08-26 NOTE — H&P ADULT - ASSESSMENT
74-year-old male past medical history of COPD current smoker, hypertension, "heart problem" presenting to ED for evaluation of shortness of breath and lightheadedness worse with ambulation for the last 2 days. Otherwise denies any fever, chills, headache, changes in vision, cough, congestion, cp, palpitations,  n/v/d, abd pain, constipation, urinary complaints, lower extremity pain/swelling.        # Dizziness since weeks  # H/o schwannoma 3.1cm from June 2023  # Difficulty ambulation    - PCP Dr mccurdy  -  ED: Vitals were: BP: 188/105  HR: 71  RR: 18 Temp:   sattting in RA  - labs:  troponin 16, BNP 54, Na 127   - - T/t: s/p bolus 500 cc  - NSxy consulted by ED, needs clearance from medicine.  - Imaging:   - CT head  Right middle cranial fossa questionable isodense 3.4 cm mass, not present   January 2021; possible noncalcified meningioma. Apparent mass effect on   subjacent temporal lobe  - MRI head: 1.  Dural-based enhancing mass arising from the anterior wall of the   right middle cranial fossa measuring up to 3.7 cm with prominent dural   thickening/enhancement over the right frontal and temporal convexities.   No significant peritumoral edema. The lesion likely reflects a   meningioma, however is significantly increased in size since the CT head   1/22/21 (retrospectively measured about 1.4 cm on that exam).  2.  Mild chronic microvascular changes    # MRI thoracic spine: 1.  Redemonstrated well marginated mass at the left medial lung apex   measuring about 4 cm.  It demonstrates extension into the left T1-2   neural foramen favoring a neurogenic lesion (such as schwannoma) over   primary lung lesion. Tissue sampling is suggested.    2.  Partially visualized right thyroid lesion, recommend further   evaluation with thyroid sonogram.    3.  Nonspecific heterogeneous marrow signal, with questionable focal   lesion within the T4 vertebral body measuring about 1.8 cm (vs artifact).   Follow-up imaging(MRI) or nuclear medicine bone scan may be obtained.    - CT angio Head/Neck pending / CT abdomen/pelvis with/wo contrast pending  - CT chest negative for PE   - Oncology consult   - will get records from PCP about previous results  - Needs clearance before proceeding for surgery             # COPD  #HTN   -  /105 on admission  - will recheck and moniter  - c/w home meds      DVT: Enoxaparin 40mg  Diet: dash diet  activity as tolerated  dispo: medicine                               74-year-old male past medical history of COPD current smoker, hypertension, "heart problem" presenting to ED for evaluation of shortness of breath and lightheadedness worse with ambulation for the last 2 days. Otherwise denies any fever, chills, headache, changes in vision, cough, congestion, cp, palpitations,  n/v/d, abd pain, constipation, urinary complaints, lower extremity pain/swelling.        # Dizziness since weeks  # H/o schwannoma 3.1cm from June 2023  # Difficulty ambulation    - PCP Dr mccurdy  -  ED: Vitals were: BP: 188/105  HR: 71  RR: 18 Temp:   sattting in RA  - labs:  troponin 16, BNP 54, Na 127   - - T/t: s/p bolus 500 cc  - NSxy consulted by ED, needs clearance from medicine.  - Imaging:   - CT head  Right middle cranial fossa questionable isodense 3.4 cm mass, not present   January 2021; possible noncalcified meningioma. Apparent mass effect on   subjacent temporal lobe  - MRI head: 1.  Dural-based enhancing mass arising from the anterior wall of the   right middle cranial fossa measuring up to 3.7 cm with prominent dural   thickening/enhancement over the right frontal and temporal convexities.   No significant peritumoral edema. The lesion likely reflects a   meningioma, however is significantly increased in size since the CT head   1/22/21 (retrospectively measured about 1.4 cm on that exam).  2.  Mild chronic microvascular changes    # MRI thoracic spine: 1.  Redemonstrated well marginated mass at the left medial lung apex   measuring about 4 cm.  It demonstrates extension into the left T1-2   neural foramen favoring a neurogenic lesion (such as schwannoma) over   primary lung lesion. Tissue sampling is suggested.    2.  Partially visualized right thyroid lesion, recommend further   evaluation with thyroid sonogram.    3.  Nonspecific heterogeneous marrow signal, with questionable focal   lesion within the T4 vertebral body measuring about 1.8 cm (vs artifact).   Follow-up imaging(MRI) or nuclear medicine bone scan may be obtained.    - CT angio Head/Neck pending / CT abdomen/pelvis with/wo contrast pending  - CT chest negative for PE   - Oncology consult   - will get records from PCP about previous results  - Needs clearance before proceeding for surgery       # Hyponatremia ( Na 127 )  - SIADH vs hypovolemic   - c/w gentle hydration  - follow up urine lytes        # COPD  #HTN   -  /105 on admission  - will recheck and moniter  - c/w home meds      DVT: Enoxaparin 40mg  Diet: dash diet  activity as tolerated  dispo: medicine                               74-year-old male past medical history of COPD current smoker, hypertension, "heart problem" presenting to ED for evaluation of shortness of breath and lightheadedness worse with ambulation for the last 2 days. Otherwise denies any fever, chills, headache, changes in vision, cough, congestion, cp, palpitations,  n/v/d, abd pain, constipation, urinary complaints, lower extremity pain/swelling.        # Dizziness since weeks  # H/o schwannoma 3.1cm from June 2023  # Difficulty ambulation    - PCP Dr chong  -  ED: Vitals were: BP: 188/105  HR: 71  RR: 18 Temp:   sattting in RA  - labs:  troponin 16, BNP 54, Na 127   - - T/t: s/p bolus 500 cc  - NSxy consulted by ED, needs clearance from medicine.  - Imaging:   - CT head  Right middle cranial fossa questionable isodense 3.4 cm mass, not present   January 2021; possible noncalcified meningioma. Apparent mass effect on   subjacent temporal lobe  - MRI head: 1.  Dural-based enhancing mass arising from the anterior wall of the   right middle cranial fossa measuring up to 3.7 cm with prominent dural   thickening/enhancement over the right frontal and temporal convexities.   No significant peritumoral edema. The lesion likely reflects a   meningioma, however is significantly increased in size since the CT head   1/22/21 (retrospectively measured about 1.4 cm on that exam).  2.  Mild chronic microvascular changes    # MRI thoracic spine: 1.  Redemonstrated well marginated mass at the left medial lung apex   measuring about 4 cm.  It demonstrates extension into the left T1-2   neural foramen favoring a neurogenic lesion (such as schwannoma) over   primary lung lesion. Tissue sampling is suggested.    2.  Partially visualized right thyroid lesion, recommend further   evaluation with thyroid sonogram.    3.  Nonspecific heterogeneous marrow signal, with questionable focal   lesion within the T4 vertebral body measuring about 1.8 cm (vs artifact).   Follow-up imaging(MRI) or nuclear medicine bone scan may be obtained.    - CT angio Head/Neck pending / CT abdomen/pelvis with/wo contrast pending  - CT chest negative for PE   - Oncology consult once we have definitive diagnosis  - will get records from PCP about previous results  - Needs clearance before proceeding for surgery       # Hyponatremia ( Na 127 )  - SIADH vs hypovolemic   - c/w gentle hydration  - follow up urine lytes        # COPD  #HTN   -  /105 on admission  - will recheck and moniter  - c/w home meds      DVT: Enoxaparin 40mg  Diet: dash diet  activity as tolerated  dispo: medicine   PLEASE CALL DR CHONG OFFICE  ( [phone]) TOMORROW TO GET PT RECORDS.                              74-year-old male past medical history of COPD current smoker, hypertension, "heart problem" presenting to ED for evaluation of shortness of breath and lightheadedness worse with ambulation for the last 2 days. Otherwise denies any fever, chills, headache, changes in vision, cough, congestion, cp, palpitations,  n/v/d, abd pain, constipation, urinary complaints, lower extremity pain/swelling.        # Dizziness since weeks  # H/o schwannoma 3.1cm from June 2023  # Difficulty ambulation    - PCP Dr chong  -  ED: Vitals were: BP: 188/105  HR: 71  RR: 18 Temp:   sattting in RA  - labs:  troponin 16, BNP 54, Na 127   - - T/t: s/p bolus 500 cc  - NSxy consulted by ED, needs clearance from medicine.  - Imaging:   - CT head  Right middle cranial fossa questionable isodense 3.4 cm mass, not present   January 2021; possible noncalcified meningioma. Apparent mass effect on   subjacent temporal lobe  - MRI head: 1.  Dural-based enhancing mass arising from the anterior wall of the   right middle cranial fossa measuring up to 3.7 cm with prominent dural   thickening/enhancement over the right frontal and temporal convexities.   No significant peritumoral edema. The lesion likely reflects a   meningioma, however is significantly increased in size since the CT head   1/22/21 (retrospectively measured about 1.4 cm on that exam).  2.  Mild chronic microvascular changes    # MRI thoracic spine: 1.  Redemonstrated well marginated mass at the left medial lung apex   measuring about 4 cm.  It demonstrates extension into the left T1-2   neural foramen favoring a neurogenic lesion (such as schwannoma) over   primary lung lesion. Tissue sampling is suggested.    2.  Partially visualized right thyroid lesion, recommend further   evaluation with thyroid sonogram.    3.  Nonspecific heterogeneous marrow signal, with questionable focal   lesion within the T4 vertebral body measuring about 1.8 cm (vs artifact).   Follow-up imaging(MRI) or nuclear medicine bone scan may be obtained.    - CT angio Head/Neck pending / CT abdomen/pelvis with/wo contrast pending  - CT chest negative for PE   - Oncology consult once we have definitive diagnosis  - IR consult for tissue sampling of lung mass   - will get records from PCP about previous results  - Needs clearance before proceeding for surgery       # Hyponatremia ( Na 127 )  - SIADH vs hypovolemic   - c/w gentle hydration  - follow up urine lytes        # COPD  #HTN   -  /105 on admission  - will recheck and moniter  - c/w home meds      DVT: Enoxaparin 40mg  Diet: dash diet  activity as tolerated  dispo: medicine   PLEASE CALL DR CHONG OFFICE  ( [phone]) TOMORROW TO GET PT RECORDS.

## 2024-08-26 NOTE — H&P ADULT - NSHPPHYSICALEXAM_GEN_ALL_CORE
GENERAL: NAD, lying in bed comfortably  HEAD:  Atraumatic, normocephalic  EYES: EOMI, PERRL  NECK: Supple, trachea midline, no JVD  HEART: Regular rate and rhythm  LUNGS: Unlabored respirations.  decreased air entry b/l, no crackles, wheezing, or rhonchi  ABDOMEN: Soft, nontender, nondistended, +BS  EXTREMITIES: 2+ peripheral pulses bilaterally. No clubbing, cyanosis, or edema  NERVOUS SYSTEM:  A&Ox3, moving all extremities, no focal deficits

## 2024-08-26 NOTE — ED ADULT TRIAGE NOTE - CHIEF COMPLAINT QUOTE
BIBA from home with complaints of multiple episodes of dizziness with syncope yesterday and today. Patient also complaining of difficulty ambulating. FS in triage , no neurological deficits noted in triage.

## 2024-08-26 NOTE — ED PROVIDER NOTE - ATTENDING CONTRIBUTION TO CARE
74-year-old male Cantonese speaking  used ID [id number] past medical history of hypertension COPD current smoker presenting here complaining of dizziness that he describes as feeling like he is going to pass out no room spinning patient states symptoms started 2 days ago did not tell his wife until tonight when the dizziness started becoming worsening over the afternoon and progressed onto the night is also been complaining of dyspnea on exertion and dyspnea with laying flat with ambulation on the dizziness that he describes as near syncope worsens states follows up with a cardiologist at a clinic not here at [facility].  CONSTITUTIONAL: WA / WN / NAD  HEAD: NCAT  EYES: PERRL; EOMI;   ENT: Normal pharynx; mucous membranes pink/moist, no erythema.  NECK: Supple; no meningeal signs  CARD: RRR; nl S1/S2; no M/R/G.   RESP: Respiratory rate and effort are normal; breath sounds clear and equal bilaterally.  ABD: Soft, NT ND   MSK/EXT: No gross deformities; full range of motion.  SKIN: Warm and dry;   NEURO: AAOx3,  PSYCH: Memory Intact, Normal Affect

## 2024-08-26 NOTE — H&P ADULT - NSHPLABSRESULTS_GEN_ALL_CORE
LABS:                          14.6   7.47  )-----------( 275      ( 26 Aug 2024 01:23 )             41.5     08-26    127<L>  |  91<L>  |  20  ----------------------------<  138<H>  3.5   |  24  |  0.9    Ca    8.9      26 Aug 2024 01:23    TPro  6.8  /  Alb  4.4  /  TBili  0.4  /  DBili  x   /  AST  14  /  ALT  19  /  AlkPhos  52  08-26    LIVER FUNCTIONS - ( 26 Aug 2024 01:23 )  Alb: 4.4 g/dL / Pro: 6.8 g/dL / ALK PHOS: 52 U/L / ALT: 19 U/L / AST: 14 U/L / GGT: x             Urinalysis Basic - ( 26 Aug 2024 01:23 )    Color: x / Appearance: x / SG: x / pH: x  Gluc: 138 mg/dL / Ketone: x  / Bili: x / Urobili: x   Blood: x / Protein: x / Nitrite: x   Leuk Esterase: x / RBC: x / WBC x   Sq Epi: x / Non Sq Epi: x / Bacteria: x    CT head:   Right middle cranial fossa questionable isodense 3.4 cm mass, not present   January 2021; possible noncalcified meningioma. Apparent mass effect on   subjacent temporal lobe. Further evaluation recommended with MR brain   with and without IV contrast.    No evidence of acute intracranial abnormality.      MRI head:   1.  Dural-based enhancing mass arising from the anterior wall of the   right middle cranial fossa measuring up to 3.7 cm with prominent dural   thickening/enhancement over the right frontal and temporal convexities.   No significant peritumoral edema. The lesion likely reflects a   meningioma, however is significantly increased in size since the CT head   1/22/21 (retrospectively measured about 1.4 cm on that exam).    2.  Mild chronic microvascular changes      MR thoracic spine:     1.  Redemonstrated well marginated mass at the left medial lung apex   measuring about 4 cm.  It demonstrates extension into the left T1-2   neural foramen favoring a neurogenic lesion (such as schwannoma) over   primary lung lesion. Tissue sampling is suggested.    2.  Partially visualized right thyroid lesion, recommend further   evaluation with thyroid sonogram.    3.  Nonspecific heterogeneous marrow signal, with questionable focal   lesion within the T4 vertebral body measuring about 1.8 cm (vs artifact).   Follow-up imaging(MRI) or nuclear medicine bone scan may be obtained.

## 2024-08-26 NOTE — H&P ADULT - HISTORY OF PRESENT ILLNESS
74-year-old male past medical history of COPD current smoker, hypertension, "heart problem" presenting to ED for evaluation of shortness of breath and lightheadedness worse with ambulation for the last 2 days. Otherwise denies any fever, chills, headache, changes in vision, cough, congestion, cp, palpitations,  n/v/d, abd pain, constipation, urinary complaints, lower extremity pain/swelling.    In the ED: Vitals were: BP: 188/105  HR: 71  RR: 18 Temp:   sattting in RA  Labs: troponin 16, BNP 54, Na 127   Imaging: CT chest/CT head was done  T/t: s/p bolus 500 cc  NSxy consulted by ED, needs clearance from medicine.     74-year-old male past medical history of COPD current smoker, hypertension, "heart problem", Left sided Schwannoma (dx with CT/PET, last PET shows 3.1 cm in June 2023)  presenting to ED for evaluation of shortness of breath and lightheadedness worse with ambulation for the last 2 days. Otherwise denies any fever, chills, headache, changes in vision, cough, congestion, cp, palpitations,  n/v/d, abd pain, constipation, urinary complaints, lower extremity pain/swelling.    In the ED: Vitals were: BP: 188/105  HR: 71  RR: 18 Temp:   sattting in RA  Labs: troponin 16, BNP 54, Na 127   Imaging: CT chest/CT head was done  T/t: s/p bolus 500 cc  NSxy consulted by ED, needs clearance from medicine.

## 2024-08-26 NOTE — ED PROVIDER NOTE - OBJECTIVE STATEMENT
Patient is a 74-year-old male past medical history of COPD current smoker, hypertension, "heart problem" presenting to ED for evaluation of shortness of breath and lightheadedness worse with ambulation for the last 2 days. Otherwise denies any fever, chills, headache, changes in vision, cough, congestion, cp, palpitations,  n/v/d, abd pain, constipation, urinary complaints, lower extremity pain/swelling.

## 2024-08-26 NOTE — CONSULT NOTE ADULT - ASSESSMENT
74M PMHx COPD, tobacco use (>100 pack years), HTN, and L temporal mass (6/2023 PET showed 3.1cm, schwannoma vs non-calcified meningioma) who presented to ED on 8/26 with 2 days dizziness and difficulty with ambulation. CTA chest demonstrated a L apical 4 x 3 cm mass, with possible continuation to upper thoracic neuroforamen with c/f neurogenic mass vs pancoast tumor. Thoracic surgery consulted for evaluation for mass biopsy.    - Will re-evaluate in AM for further workup of apical mass  - F/u MRI brain, CTA head and neck, metastasis workup with CTAP, medicine/pulm workup, oncology consult    Discussed with and approved by Dr. Jr Alonzo.    Thoracic surgery ([id number])

## 2024-08-27 LAB
ALBUMIN SERPL ELPH-MCNC: 4.1 G/DL — SIGNIFICANT CHANGE UP (ref 3.5–5.2)
ALP SERPL-CCNC: 54 U/L — SIGNIFICANT CHANGE UP (ref 30–115)
ALT FLD-CCNC: 19 U/L — SIGNIFICANT CHANGE UP (ref 0–41)
ANION GAP SERPL CALC-SCNC: 7 MMOL/L — SIGNIFICANT CHANGE UP (ref 7–14)
AST SERPL-CCNC: 14 U/L — SIGNIFICANT CHANGE UP (ref 0–41)
BASOPHILS # BLD AUTO: 0.06 K/UL — SIGNIFICANT CHANGE UP (ref 0–0.2)
BASOPHILS NFR BLD AUTO: 1 % — SIGNIFICANT CHANGE UP (ref 0–1)
BILIRUB SERPL-MCNC: 1 MG/DL — SIGNIFICANT CHANGE UP (ref 0.2–1.2)
BUN SERPL-MCNC: 13 MG/DL — SIGNIFICANT CHANGE UP (ref 10–20)
CALCIUM SERPL-MCNC: 8.7 MG/DL — SIGNIFICANT CHANGE UP (ref 8.4–10.5)
CHLORIDE SERPL-SCNC: 99 MMOL/L — SIGNIFICANT CHANGE UP (ref 98–110)
CO2 SERPL-SCNC: 29 MMOL/L — SIGNIFICANT CHANGE UP (ref 17–32)
CREAT SERPL-MCNC: 0.9 MG/DL — SIGNIFICANT CHANGE UP (ref 0.7–1.5)
EGFR: 91 ML/MIN/1.73M2 — SIGNIFICANT CHANGE UP
EOSINOPHIL # BLD AUTO: 0.2 K/UL — SIGNIFICANT CHANGE UP (ref 0–0.7)
EOSINOPHIL NFR BLD AUTO: 3.2 % — SIGNIFICANT CHANGE UP (ref 0–8)
GLUCOSE SERPL-MCNC: 154 MG/DL — HIGH (ref 70–99)
HCT VFR BLD CALC: 44.1 % — SIGNIFICANT CHANGE UP (ref 42–52)
HGB BLD-MCNC: 15.1 G/DL — SIGNIFICANT CHANGE UP (ref 14–18)
IMM GRANULOCYTES NFR BLD AUTO: 0.3 % — SIGNIFICANT CHANGE UP (ref 0.1–0.3)
LYMPHOCYTES # BLD AUTO: 1.71 K/UL — SIGNIFICANT CHANGE UP (ref 1.2–3.4)
LYMPHOCYTES # BLD AUTO: 27.4 % — SIGNIFICANT CHANGE UP (ref 20.5–51.1)
MAGNESIUM SERPL-MCNC: 2.3 MG/DL — SIGNIFICANT CHANGE UP (ref 1.8–2.4)
MCHC RBC-ENTMCNC: 31.4 PG — HIGH (ref 27–31)
MCHC RBC-ENTMCNC: 34.2 G/DL — SIGNIFICANT CHANGE UP (ref 32–37)
MCV RBC AUTO: 91.7 FL — SIGNIFICANT CHANGE UP (ref 80–94)
MONOCYTES # BLD AUTO: 0.61 K/UL — HIGH (ref 0.1–0.6)
MONOCYTES NFR BLD AUTO: 9.8 % — HIGH (ref 1.7–9.3)
NEUTROPHILS # BLD AUTO: 3.64 K/UL — SIGNIFICANT CHANGE UP (ref 1.4–6.5)
NEUTROPHILS NFR BLD AUTO: 58.3 % — SIGNIFICANT CHANGE UP (ref 42.2–75.2)
NRBC # BLD: 0 /100 WBCS — SIGNIFICANT CHANGE UP (ref 0–0)
OSMOLALITY SERPL: 289 MOS/KG — SIGNIFICANT CHANGE UP (ref 280–301)
PLATELET # BLD AUTO: 284 K/UL — SIGNIFICANT CHANGE UP (ref 130–400)
PMV BLD: 8 FL — SIGNIFICANT CHANGE UP (ref 7.4–10.4)
POTASSIUM SERPL-MCNC: 4.4 MMOL/L — SIGNIFICANT CHANGE UP (ref 3.5–5)
POTASSIUM SERPL-SCNC: 4.4 MMOL/L — SIGNIFICANT CHANGE UP (ref 3.5–5)
PROT SERPL-MCNC: 6.7 G/DL — SIGNIFICANT CHANGE UP (ref 6–8)
RBC # BLD: 4.81 M/UL — SIGNIFICANT CHANGE UP (ref 4.7–6.1)
RBC # FLD: 13.3 % — SIGNIFICANT CHANGE UP (ref 11.5–14.5)
SODIUM SERPL-SCNC: 135 MMOL/L — SIGNIFICANT CHANGE UP (ref 135–146)
TSH SERPL-MCNC: 1.01 UIU/ML — SIGNIFICANT CHANGE UP (ref 0.27–4.2)
WBC # BLD: 6.24 K/UL — SIGNIFICANT CHANGE UP (ref 4.8–10.8)
WBC # FLD AUTO: 6.24 K/UL — SIGNIFICANT CHANGE UP (ref 4.8–10.8)

## 2024-08-27 RX ORDER — NIFEDIPINE 60 MG/1
90 TABLET, FILM COATED, EXTENDED RELEASE ORAL DAILY
Refills: 0 | Status: DISCONTINUED | OUTPATIENT
Start: 2024-08-27 | End: 2024-08-29

## 2024-08-27 RX ORDER — HYDRALAZINE HCL 50 MG
25 TABLET ORAL THREE TIMES A DAY
Refills: 0 | Status: DISCONTINUED | OUTPATIENT
Start: 2024-08-27 | End: 2024-08-29

## 2024-08-27 RX ADMIN — Medication 200 MILLIGRAM(S): at 21:41

## 2024-08-27 RX ADMIN — ENOXAPARIN SODIUM 40 MILLIGRAM(S): 100 INJECTION SUBCUTANEOUS at 17:15

## 2024-08-27 RX ADMIN — Medication 25 MILLIGRAM(S): at 21:41

## 2024-08-27 RX ADMIN — LOSARTAN POTASSIUM 100 MILLIGRAM(S): 50 TABLET ORAL at 05:22

## 2024-08-27 RX ADMIN — BUDESONIDE AND FORMOTEROL FUMARATE 2 PUFF(S): 80; 4.5 AEROSOL, METERED RESPIRATORY (INHALATION) at 08:24

## 2024-08-27 RX ADMIN — Medication 200 MILLIGRAM(S): at 11:00

## 2024-08-27 RX ADMIN — Medication 100 MILLIGRAM(S): at 05:21

## 2024-08-27 RX ADMIN — BUDESONIDE AND FORMOTEROL FUMARATE 2 PUFF(S): 80; 4.5 AEROSOL, METERED RESPIRATORY (INHALATION) at 21:36

## 2024-08-27 RX ADMIN — TIOTROPIUM BROMIDE INHALATION SPRAY 2 PUFF(S): 3.12 SPRAY, METERED RESPIRATORY (INHALATION) at 08:24

## 2024-08-27 RX ADMIN — Medication 200 MILLIGRAM(S): at 13:36

## 2024-08-27 RX ADMIN — Medication 25 MILLIGRAM(S): at 13:36

## 2024-08-27 RX ADMIN — Medication 25 MILLIGRAM(S): at 05:21

## 2024-08-27 RX ADMIN — NIFEDIPINE 90 MILLIGRAM(S): 60 TABLET, FILM COATED, EXTENDED RELEASE ORAL at 09:02

## 2024-08-27 NOTE — CONSULT NOTE ADULT - SUBJECTIVE AND OBJECTIVE BOX
INTERVENTIONAL RADIOLOGY CONSULT:     Procedure Requested: lung biopsy    HPI:  74-year-old male past medical history of COPD current smoker, hypertension, "heart problem", Left sided Schwannoma (dx with CT/PET, last PET shows 3.1 cm in June 2023)  presenting to ED for evaluation of shortness of breath and lightheadedness worse with ambulation for the last 2 days. Otherwise denies any fever, chills, headache, changes in vision, cough, congestion, cp, palpitations,  n/v/d, abd pain, constipation, urinary complaints, lower extremity pain/swelling.    In the ED: Vitals were: BP: 188/105  HR: 71  RR: 18 Temp:   sattting in RA  Labs: troponin 16, BNP 54, Na 127   Imaging: CT chest/CT head was done  T/t: s/p bolus 500 cc  NSxy consulted by ED, needs clearance from medicine.     (26 Aug 2024 16:26)      PAST MEDICAL & SURGICAL HISTORY:  Hypertension      COPD, mild      Thoracic aortic aneurysm      Chronic hyponatremia      Emphysema/COPD      No significant past surgical history          MEDICATIONS  (STANDING):  budesonide  80 MICROgram(s)/formoterol 4.5 MICROgram(s) Inhaler 2 Puff(s) Inhalation two times a day  enoxaparin Injectable 40 milliGRAM(s) SubCutaneous every 24 hours  hydrALAZINE 25 milliGRAM(s) Oral three times a day  labetalol 100 milliGRAM(s) Oral two times a day  losartan 100 milliGRAM(s) Oral daily  NIFEdipine XL 90 milliGRAM(s) Oral daily  sodium chloride 0.9%. 1000 milliLiter(s) (75 mL/Hr) IV Continuous <Continuous>  tiotropium 2.5 MICROgram(s) Inhaler 2 Puff(s) Inhalation daily    MEDICATIONS  (PRN):      Allergies    No Known Allergies    Intolerances    FAMILY HISTORY:  FH: hypertension        Physical Exam:   Vital Signs Last 24 Hrs  T(C): 36.4 (27 Aug 2024 10:00), Max: 36.8 (26 Aug 2024 19:03)  T(F): 97.6 (27 Aug 2024 10:00), Max: 98.2 (26 Aug 2024 19:03)  HR: 85 (27 Aug 2024 07:42) (71 - 86)  BP: 177/95 (27 Aug 2024 10:00) (147/82 - 185/91)  BP(mean): --  RR: 18 (27 Aug 2024 10:00) (16 - 18)  SpO2: 95% (27 Aug 2024 10:00) (95% - 99%)    Parameters below as of 27 Aug 2024 10:00  Patient On (Oxygen Delivery Method): room air      Labs:                         15.1   6.24  )-----------( 284      ( 27 Aug 2024 09:35 )             44.1     08-26    127<L>  |  91<L>  |  20  ----------------------------<  138<H>  3.5   |  24  |  0.9    Ca    8.9      26 Aug 2024 01:23    TPro  6.8  /  Alb  4.4  /  TBili  0.4  /  DBili  x   /  AST  14  /  ALT  19  /  AlkPhos  52  08-26        Pertinent labs:                      15.1   6.24  )-----------( 284      ( 27 Aug 2024 09:35 )             44.1       08-26    127<L>  |  91<L>  |  20  ----------------------------<  138<H>  3.5   |  24  |  0.9    Ca    8.9      26 Aug 2024 01:23    TPro  6.8  /  Alb  4.4  /  TBili  0.4  /  DBili  x   /  AST  14  /  ALT  19  /  AlkPhos  52  08-26    Radiology & Additional Studies:     IMPRESSION:    No large vessel occlusion, aneurysm, or vascular malformation.    Moderate left M1 MCA stenosis and mild right M1 MCA stenosis.   Redemonstrated extra-axial mass in the right temporal convexity likely   reflecting meningioma which slightly superiorly displaces the proximal   right M2 MCA branches.    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    --- End of Report ---    Radiology imaging reviewed.       ASSESSMENT/ PLAN:   74M PMHx COPD, tobacco use (>100 pack years), HTN, and L temporal mass (6/2023 PET showed 3.1cm, schwannoma vs non-calcified meningioma) who presented to ED on 8/26 with 2 days dizziness and difficulty with ambulation. CTA chest demonstrated a L apical 4 x 3 cm mass, with possible continuation to upper thoracic neuroforamen with c/f neurogenic mass vs pancoast tumor. Thoracic surgery consulted for evaluation for mass biopsy.  IR additionally consulted for evaluation of lung mass biopsy.  - thoracic on board for evaluation of mass   - will defer until recommendations noted  - will follow     To contact Interventional Radiology with any questions, concerns or issues please utilize the following:  M-F 7am-5pm: [facility]_ir on teams,  consult spectra: [phone]  After hours/weekends/holidays: [phone]      INTERVENTIONAL RADIOLOGY CONSULT:     Procedure Requested: lung biopsy    HPI:  74-year-old male past medical history of COPD current smoker, hypertension, "heart problem", Left sided Schwannoma (dx with CT/PET, last PET shows 3.1 cm in June 2023)  presenting to ED for evaluation of shortness of breath and lightheadedness worse with ambulation for the last 2 days. Otherwise denies any fever, chills, headache, changes in vision, cough, congestion, cp, palpitations,  n/v/d, abd pain, constipation, urinary complaints, lower extremity pain/swelling.    In the ED: Vitals were: BP: 188/105  HR: 71  RR: 18 Temp:   sattting in RA  Labs: troponin 16, BNP 54, Na 127   Imaging: CT chest/CT head was done  T/t: s/p bolus 500 cc  NSxy consulted by ED, needs clearance from medicine.     (26 Aug 2024 16:26)      PAST MEDICAL & SURGICAL HISTORY:  Hypertension      COPD, mild      Thoracic aortic aneurysm      Chronic hyponatremia      Emphysema/COPD      No significant past surgical history          MEDICATIONS  (STANDING):  budesonide  80 MICROgram(s)/formoterol 4.5 MICROgram(s) Inhaler 2 Puff(s) Inhalation two times a day  enoxaparin Injectable 40 milliGRAM(s) SubCutaneous every 24 hours  hydrALAZINE 25 milliGRAM(s) Oral three times a day  labetalol 100 milliGRAM(s) Oral two times a day  losartan 100 milliGRAM(s) Oral daily  NIFEdipine XL 90 milliGRAM(s) Oral daily  sodium chloride 0.9%. 1000 milliLiter(s) (75 mL/Hr) IV Continuous <Continuous>  tiotropium 2.5 MICROgram(s) Inhaler 2 Puff(s) Inhalation daily    MEDICATIONS  (PRN):      Allergies    No Known Allergies    Intolerances    FAMILY HISTORY:  FH: hypertension        Physical Exam:   Vital Signs Last 24 Hrs  T(C): 36.4 (27 Aug 2024 10:00), Max: 36.8 (26 Aug 2024 19:03)  T(F): 97.6 (27 Aug 2024 10:00), Max: 98.2 (26 Aug 2024 19:03)  HR: 85 (27 Aug 2024 07:42) (71 - 86)  BP: 177/95 (27 Aug 2024 10:00) (147/82 - 185/91)  BP(mean): --  RR: 18 (27 Aug 2024 10:00) (16 - 18)  SpO2: 95% (27 Aug 2024 10:00) (95% - 99%)    Parameters below as of 27 Aug 2024 10:00  Patient On (Oxygen Delivery Method): room air      Labs:                         15.1   6.24  )-----------( 284      ( 27 Aug 2024 09:35 )             44.1     08-26    127<L>  |  91<L>  |  20  ----------------------------<  138<H>  3.5   |  24  |  0.9    Ca    8.9      26 Aug 2024 01:23    TPro  6.8  /  Alb  4.4  /  TBili  0.4  /  DBili  x   /  AST  14  /  ALT  19  /  AlkPhos  52  08-26        Pertinent labs:                      15.1   6.24  )-----------( 284      ( 27 Aug 2024 09:35 )             44.1       08-26    127<L>  |  91<L>  |  20  ----------------------------<  138<H>  3.5   |  24  |  0.9    Ca    8.9      26 Aug 2024 01:23    TPro  6.8  /  Alb  4.4  /  TBili  0.4  /  DBili  x   /  AST  14  /  ALT  19  /  AlkPhos  52  08-26    Radiology & Additional Studies:     IMPRESSION:    No large vessel occlusion, aneurysm, or vascular malformation.    Moderate left M1 MCA stenosis and mild right M1 MCA stenosis.   Redemonstrated extra-axial mass in the right temporal convexity likely   reflecting meningioma which slightly superiorly displaces the proximal   right M2 MCA branches.    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    --- End of Report ---    Radiology imaging reviewed.       ASSESSMENT/ PLAN:   74M PMHx COPD, tobacco use (>100 pack years), HTN, and L temporal mass (6/2023 PET showed 3.1cm, schwannoma vs non-calcified meningioma) who presented to ED on 8/26 with 2 days dizziness and difficulty with ambulation. CTA chest demonstrated a L apical 4 x 3 cm mass, with possible continuation to upper thoracic neuroforamen with c/f neurogenic mass vs pancoast tumor. Thoracic surgery consulted for evaluation for mass biopsy.  IR additionally consulted for evaluation of lung mass biopsy.  - thoracic on board for evaluation of mass   - will defer until recommendations noted  - will follow     ADDENDUM:  - spoke with thoracic team, they would prefer IR intervene   - plan for image guided lung biopsy Thursday 8/29 pending the following:  - NPO after midnight Wednesday  - hold 2 doses of Lovenox 40mg prior to procedure  - draw PT/INR  - repeat CBC, CMP prior to procedure  - if patient not consentable, please provide HCP information prior to procedure     To contact Interventional Radiology with any questions, concerns or issues please utilize the following:  M-F 7am-5pm: [facility]_ir on teams,  consult spectra: [phone]  After hours/weekends/holidays: [phone]

## 2024-08-27 NOTE — PROGRESS NOTE ADULT - ASSESSMENT
Assessment and Plan:     Present with the resident during the interview and examination of the patient by me. I personally interviewed the patient and repeated the exam. I reviewed/revised the exam and discussed with the resident in regards to assessment and plan as documented. See resident's section for details and agree with the above documented note.  I have utilized all available resources to obtain, update, or review the patients current medications.   Results of imaging, labs and EKG reviewed independently    Medical Problems:   · Assessment	  74-year-old male past medical history of COPD current smoker, hypertension, "heart problem" presenting to ED for evaluation of shortness of breath and lightheadedness worse with ambulation for the last 2 days. Otherwise denies any fever, chills, headache, changes in vision, cough, congestion, cp, palpitations,  n/v/d, abd pain, constipation, urinary complaints, lower extremity pain/swelling.      Meningioma   - CTH: Right middle cranial fossa questionable isodense 3.4 cm mass, not present January 2021; possible noncalcified meningioma. Apparent mass effect on subjacent temporal lobe.   - s/p eval by Neurosurgery team in ED.   - MRI brain w/wo contrast shows Dural-based enhancing mass arising from the anterior wall of the right middle cranial fossa measuring up to 3.7 cm with prominent dural thickening/enhancement over the right frontal and temporal convexities. No significant peritumoral edema. The lesion likely reflects a meningioma, however is significantly increased in size since the CT head 1/22/21 (retrospectively measured about 1.4 cm on that exam).  - f/u CTA H & N   - No need for keppra as per NSx.   - f/u NeuroSx    Left lung mass 2/2 Schwannoma vs. less likely primary lung lesion  - CTA chest: Neg for acute PE. Left apical 4 x 3 cm mass, with possible continuation to upper thoracic neuroforamen; neurogenic mass is a consideration in addition to lung Pancoast tumor in patient with emphysema.   - MRI Thoracic shows Redemonstrated well marginated mass at the left medial lung apex measuring about 4 cm.  It demonstrates extension into the left T1-2 neural foramen favoring a neurogenic lesion (such as schwannoma) over primary lung lesion.   - will get CT abdomen/pelvic w/wo contrast for metastatic w/u  - May need tissue sampling with biopsy as the lesion increased in size. Will get IR eval for biopsy.   - get outpatient CT/PET scan report from PMD (Dr. Vick).   - thoracic team, would prefer IR intervene   - plan for image guided lung biopsy Thursday 8/29 pending the following:  - NPO after midnight Wednesday  - hold 2 doses of Lovenox 40mg prior to procedure  - draw PT/INR  - repeat CBC, CMP prior to procedure    T4 vertebral lesion  - MRI thoracic shows Nonspecific heterogeneous marrow signal, with questionable focal lesion within the T4 vertebral body measuring about 1.8 cm (vs artifact).   - will get Bone scan    Thyroid lesion   - MRI shows Partially visualized right thyroid lesion.  - check thyroid US   - check TSH, FT4.    Chronic Hyponatremia  - serum Na 127 currently. Serum Na 124-131 in 2021.  - check serum osmolarity, urine osmolarity, urine electrolytes, TSH, AM cortisol  - Hold HCTZ   - repeat BMP    COPD/Emphysema - c/w home inhalers.   HTN - c/w home med.   Active smoker - nicotine patch prn    #Disposition and Medical Necessity:  - thoracic team, would prefer IR intervene   - plan for image guided lung biopsy Thursday 8/29 pending the following:  - NPO after midnight Wednesday  - hold 2 doses of Lovenox 40mg prior to procedure  - draw PT/INR  - repeat CBC, CMP prior to procedure    I have seen and examined the patient today and I spend time with the patient half of the time face-to-face encounter, I examine the patient, reviewed medications, I have reviewed laboratories, and imaging, and discussed plan of care with nurses staff. Please excuse any dictation or typographical errors that have not been edited out.    Plan of care was discussed with the patient and or family and they agree with plan of care with good feedback.

## 2024-08-27 NOTE — CONSULT NOTE ADULT - SUBJECTIVE AND OBJECTIVE BOX
Neuroendovascular Consult note:   Consulted for: Tumor embolization   Estrellita : [id number]    HPI:  Patient is a 74 year old male, history of COPD, current smoker, HTN, presented for shortness of breath and lightheadedness worsened with positional change and ambulation - CTH with 3.4cm temporal mass, possible noncalcified meningioma. CTA chest revealing left apical tumor - plan for bx on this admission. Patient is planned for tumor excision this Thursday with neurosurgery. Neuroendovascular team consulted for evaluation for possible tumor embolization prior to resection. Seen and examined at bedside with wife present.       Past medical history:   Hypertension    COPD, mild    Thoracic aortic aneurysm    Chronic hyponatremia    Emphysema/COPD        Medications:  budesonide  80 MICROgram(s)/formoterol 4.5 MICROgram(s) Inhaler 2 Puff(s) Inhalation two times a day  enoxaparin Injectable 40 milliGRAM(s) SubCutaneous every 24 hours  hydrALAZINE 25 milliGRAM(s) Oral three times a day  labetalol 200 milliGRAM(s) Oral three times a day  losartan 100 milliGRAM(s) Oral daily  NIFEdipine XL 90 milliGRAM(s) Oral daily  sodium chloride 0.9%. 1000 milliLiter(s) IV Continuous <Continuous>  tiotropium 2.5 MICROgram(s) Inhaler 2 Puff(s) Inhalation daily      Vitals:   T(F): 97.6 (08-27-24 @ 15:27), Max: 98.5 (08-27-24 @ 11:43)  HR: 80 (08-27-24 @ 15:27) (71 - 86)  BP: 114/68 (08-27-24 @ 15:27) (114/68 - 185/91)  RR: 18 (08-27-24 @ 15:27) (16 - 18)  SpO2: 96% (08-27-24 @ 15:27) (95% - 99%)    Labs:                         15.1   6.24  )-----------( 284      ( 27 Aug 2024 09:35 )             44.1     08-27    135  |  99  |  13  ----------------------------<  154<H>  4.4   |  29  |  0.9    Ca    8.7      27 Aug 2024 09:35  Mg     2.3     08-27    TPro  6.7  /  Alb  4.1  /  TBili  1.0  /  DBili  x   /  AST  14  /  ALT  19  /  AlkPhos  54  08-27      LIVER FUNCTIONS - ( 27 Aug 2024 09:35 )  Alb: 4.1 g/dL / Pro: 6.7 g/dL / ALK PHOS: 54 U/L / ALT: 19 U/L / AST: 14 U/L / GGT: x             Exam:   General - NAD   Neuro- awake, alert, hard of hearing, oriented x3, follows commands, EOMi, visual fields intact, moving all extremities to antigravity, sensation intact, no neglect, nonaphasic     Radiology:   < from: CT Angio Neck w/ IV Cont (08.26.24 @ 17:03) >    IMPRESSION:    No large vessel occlusion, aneurysm, or vascular malformation.    Moderate left M1 MCA stenosis and mild right M1 MCA stenosis.   Redemonstrated extra-axial mass in the right temporal convexity likely   reflecting meningioma which slightly superiorly displaces the proximal   right M2 MCA branches.    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      < end of copied text >  < from: CT Head No Cont (08.26.24 @ 05:44) >  Impression:    Right middle cranial fossa questionable isodense 3.4 cm mass, not present   January 2021; possible noncalcified meningioma. Apparent mass effect on   subjacent temporal lobe. Further evaluation recommended with MR brain   with and without IV contrast.    No evidence of acute intracranial abnormality.    < end of copied text >      Assessment:   74 year old male, history of COPD, active smoker, HTN, presents with SOB and DESHPANDE with dizziness on positional change, CTH with right temporal calcified mass, likely meningioma - neuroendovascular consulted for evaluation for tumor embolization prior to neurosurgical removal.     Suggestions:  Diagnostic cerebral angiogram and tumor/ middle meningeal artery embolization tomorrow   - NPO except medications on IVF as tolerated after midnight   - AM labs: CBC/CMP/Coag panel   - Active T&S   Discussed with wife and patient   neurosurgery aware   Discussed with Dr Clemons   [phone]

## 2024-08-27 NOTE — PROGRESS NOTE ADULT - ASSESSMENT
74-year-old male past medical history of COPD current smoker, hypertension, "heart problem" presenting to ED for evaluation of shortness of breath and lightheadedness worse with ambulation for the last 2 days. Otherwise denies any fever, chills, headache, changes in vision, cough, congestion, cp, palpitations,  n/v/d, abd pain, constipation, urinary complaints, lower extremity pain/swelling.    # Dizziness since weeks  # H/o schwannoma 3.1cm from June 2023  # Difficulty ambulation    - PCP Dr mccurdy  -  ED: Vitals were: BP: 188/105  HR: 71  RR: 18 Temp:   sattting in RA  - labs:  troponin 16, BNP 54, Na 127   - - T/t: s/p bolus 500 cc  - NSxy consulted by ED, needs clearance from medicine.  - Imaging:   - CT head  Right middle cranial fossa questionable isodense 3.4 cm mass, not present   January 2021; possible noncalcified meningioma. Apparent mass effect on   subjacent temporal lobe  - MRI head: 1.  Dural-based enhancing mass arising from the anterior wall of the   right middle cranial fossa measuring up to 3.7 cm with prominent dural   thickening/enhancement over the right frontal and temporal convexities.   No significant peritumoral edema. The lesion likely reflects a   meningioma, however is significantly increased in size since the CT head   1/22/21 (retrospectively measured about 1.4 cm on that exam).  2.  Mild chronic microvascular changes    # MRI thoracic spine: 1.  Redemonstrated well marginated mass at the left medial lung apex   measuring about 4 cm.  It demonstrates extension into the left T1-2   neural foramen favoring a neurogenic lesion (such as schwannoma) over   primary lung lesion. Tissue sampling is suggested.    2.  Partially visualized right thyroid lesion, recommend further   evaluation with thyroid sonogram.    3.  Nonspecific heterogeneous marrow signal, with questionable focal   lesion within the T4 vertebral body measuring about 1.8 cm (vs artifact).   Follow-up imaging(MRI) or nuclear medicine bone scan may be obtained.    - CT angio Head/Neck pending / CT abdomen/pelvis with/wo contrast pending  - CT chest negative for PE   - Oncology consult once we have definitive diagnosis  - IR consult for tissue sampling of lung mass   - will get records from PCP about previous results  - Needs clearance before proceeding for surgery       # Hyponatremia ( Na 127 )  - SIADH vs hypovolemic   - c/w gentle hydration  - follow up urine lytes    # COPD  #HTN   -  /105 on admission  - will recheck and moniter  - c/w home meds    DVT: Enoxaparin 40mg  Diet: dash diet  activity as tolerated  dispo: medicine   74-year-old male past medical history of COPD current smoker, hypertension, "heart problem" presenting to ED for evaluation of shortness of breath and lightheadedness worse with ambulation for the last 2 days. Otherwise denies any fever, chills, headache, changes in vision, cough, congestion, cp, palpitations,  n/v/d, abd pain, constipation, urinary complaints, lower extremity pain/swelling.    # Dizziness since weeks  # H/o schwannoma 3.1cm from June 2023  # Difficulty ambulation    - PCP Dr mccuryd  -  ED: Vitals were: BP: 188/105  HR: 71  RR: 18 Temp:   sattting in RA  - labs:  troponin 16, BNP 54, Na 127   - - T/t: s/p bolus 500 cc  - NSxy consulted by ED, needs clearance from medicine.  - Imaging:   - CT head  Right middle cranial fossa questionable isodense 3.4 cm mass, not present   January 2021; possible noncalcified meningioma. Apparent mass effect on   subjacent temporal lobe  - MRI head: 1.  Dural-based enhancing mass arising from the anterior wall of the   right middle cranial fossa measuring up to 3.7 cm with prominent dural   thickening/enhancement over the right frontal and temporal convexities.   No significant peritumoral edema. The lesion likely reflects a   meningioma, however is significantly increased in size since the CT head   1/22/21 (retrospectively measured about 1.4 cm on that exam).  2.  Mild chronic microvascular changes    # MRI thoracic spine: 1.  Redemonstrated well marginated mass at the left medial lung apex   measuring about 4 cm.  It demonstrates extension into the left T1-2   neural foramen favoring a neurogenic lesion (such as schwannoma) over   primary lung lesion. Tissue sampling is suggested.    2.  Partially visualized right thyroid lesion, recommend further   evaluation with thyroid sonogram.    3.  Nonspecific heterogeneous marrow signal, with questionable focal   lesion within the T4 vertebral body measuring about 1.8 cm (vs artifact).   Follow-up imaging(MRI) or nuclear medicine bone scan may be obtained.    - CT angio Head/Neck pending / CT abdomen/pelvis with/wo contrast pending  - CT chest negative for PE   - Oncology consult once we have definitive diagnosis  - IR consult for tissue sampling of lung mass -> IR scheduled for thursday  - will get records from PCP about previous results  - Needs clearance before proceeding for surgery       # Hyponatremia ( Na 127 )  - SIADH vs hypovolemic   - c/w gentle hydration  - follow up urine lytes    # COPD  #HTN   -  /105 on admission  - will recheck and moniter  - c/w home meds    DVT: Enoxaparin 40mg  Diet: dash diet  activity as tolerated  dispo: medicine

## 2024-08-28 ENCOUNTER — TRANSCRIPTION ENCOUNTER (OUTPATIENT)
Age: 74
End: 2024-08-28

## 2024-08-28 LAB
ALBUMIN SERPL ELPH-MCNC: 4.1 G/DL — SIGNIFICANT CHANGE UP (ref 3.5–5.2)
ALP SERPL-CCNC: 53 U/L — SIGNIFICANT CHANGE UP (ref 30–115)
ALT FLD-CCNC: 18 U/L — SIGNIFICANT CHANGE UP (ref 0–41)
ANION GAP SERPL CALC-SCNC: 14 MMOL/L — SIGNIFICANT CHANGE UP (ref 7–14)
APTT BLD: 36.8 SEC — SIGNIFICANT CHANGE UP (ref 27–39.2)
AST SERPL-CCNC: 13 U/L — SIGNIFICANT CHANGE UP (ref 0–41)
BASOPHILS # BLD AUTO: 0.05 K/UL — SIGNIFICANT CHANGE UP (ref 0–0.2)
BASOPHILS NFR BLD AUTO: 0.5 % — SIGNIFICANT CHANGE UP (ref 0–1)
BILIRUB SERPL-MCNC: 0.8 MG/DL — SIGNIFICANT CHANGE UP (ref 0.2–1.2)
BUN SERPL-MCNC: 16 MG/DL — SIGNIFICANT CHANGE UP (ref 10–20)
CALCIUM SERPL-MCNC: 8.6 MG/DL — SIGNIFICANT CHANGE UP (ref 8.4–10.4)
CHLORIDE SERPL-SCNC: 101 MMOL/L — SIGNIFICANT CHANGE UP (ref 98–110)
CO2 SERPL-SCNC: 22 MMOL/L — SIGNIFICANT CHANGE UP (ref 17–32)
CORTIS AM PEAK SERPL-MCNC: 15.3 UG/DL — SIGNIFICANT CHANGE UP (ref 6–18.4)
CREAT SERPL-MCNC: 0.9 MG/DL — SIGNIFICANT CHANGE UP (ref 0.7–1.5)
EGFR: 91 ML/MIN/1.73M2 — SIGNIFICANT CHANGE UP
EOSINOPHIL # BLD AUTO: 0.26 K/UL — SIGNIFICANT CHANGE UP (ref 0–0.7)
EOSINOPHIL NFR BLD AUTO: 2.6 % — SIGNIFICANT CHANGE UP (ref 0–8)
GLUCOSE SERPL-MCNC: 105 MG/DL — HIGH (ref 70–99)
HCT VFR BLD CALC: 44.7 % — SIGNIFICANT CHANGE UP (ref 42–52)
HGB BLD-MCNC: 15.1 G/DL — SIGNIFICANT CHANGE UP (ref 14–18)
IMM GRANULOCYTES NFR BLD AUTO: 0.5 % — HIGH (ref 0.1–0.3)
INR BLD: 1.03 RATIO — SIGNIFICANT CHANGE UP (ref 0.65–1.3)
LYMPHOCYTES # BLD AUTO: 2.2 K/UL — SIGNIFICANT CHANGE UP (ref 1.2–3.4)
LYMPHOCYTES # BLD AUTO: 22.4 % — SIGNIFICANT CHANGE UP (ref 20.5–51.1)
MAGNESIUM SERPL-MCNC: 2.3 MG/DL — SIGNIFICANT CHANGE UP (ref 1.8–2.4)
MCHC RBC-ENTMCNC: 31.3 PG — HIGH (ref 27–31)
MCHC RBC-ENTMCNC: 33.8 G/DL — SIGNIFICANT CHANGE UP (ref 32–37)
MCV RBC AUTO: 92.7 FL — SIGNIFICANT CHANGE UP (ref 80–94)
MONOCYTES # BLD AUTO: 1.08 K/UL — HIGH (ref 0.1–0.6)
MONOCYTES NFR BLD AUTO: 11 % — HIGH (ref 1.7–9.3)
NEUTROPHILS # BLD AUTO: 6.18 K/UL — SIGNIFICANT CHANGE UP (ref 1.4–6.5)
NEUTROPHILS NFR BLD AUTO: 63 % — SIGNIFICANT CHANGE UP (ref 42.2–75.2)
NRBC # BLD: 0 /100 WBCS — SIGNIFICANT CHANGE UP (ref 0–0)
PLATELET # BLD AUTO: 293 K/UL — SIGNIFICANT CHANGE UP (ref 130–400)
PMV BLD: 8.3 FL — SIGNIFICANT CHANGE UP (ref 7.4–10.4)
POTASSIUM SERPL-MCNC: 3.9 MMOL/L — SIGNIFICANT CHANGE UP (ref 3.5–5)
POTASSIUM SERPL-SCNC: 3.9 MMOL/L — SIGNIFICANT CHANGE UP (ref 3.5–5)
PROT SERPL-MCNC: 6.7 G/DL — SIGNIFICANT CHANGE UP (ref 6–8)
PROTHROM AB SERPL-ACNC: 11.8 SEC — SIGNIFICANT CHANGE UP (ref 9.95–12.87)
RBC # BLD: 4.82 M/UL — SIGNIFICANT CHANGE UP (ref 4.7–6.1)
RBC # FLD: 13.4 % — SIGNIFICANT CHANGE UP (ref 11.5–14.5)
SODIUM SERPL-SCNC: 137 MMOL/L — SIGNIFICANT CHANGE UP (ref 135–146)
WBC # BLD: 9.82 K/UL — SIGNIFICANT CHANGE UP (ref 4.8–10.8)
WBC # FLD AUTO: 9.82 K/UL — SIGNIFICANT CHANGE UP (ref 4.8–10.8)

## 2024-08-28 RX ORDER — HYDROCHLOROTHIAZIDE 12.5 MG/1
1 CAPSULE ORAL
Refills: 0 | DISCHARGE

## 2024-08-28 RX ORDER — HYDROCHLOROTHIAZIDE 12.5 MG/1
1 CAPSULE ORAL
Qty: 30 | Refills: 0
Start: 2024-08-28 | End: 2024-09-26

## 2024-08-28 RX ORDER — HYDRALAZINE HCL 50 MG
1 TABLET ORAL
Qty: 90 | Refills: 0
Start: 2024-08-28 | End: 2024-09-26

## 2024-08-28 RX ORDER — HYDRALAZINE HCL 50 MG
1 TABLET ORAL
Refills: 0 | DISCHARGE

## 2024-08-28 RX ORDER — FLUTICASONE FUROATE, UMECLIDINIUM BROMIDE AND VILANTEROL TRIFENATATE 200; 62.5; 25 UG/1; UG/1; UG/1
1 POWDER RESPIRATORY (INHALATION)
Refills: 0 | DISCHARGE

## 2024-08-28 RX ORDER — FLUTICASONE FUROATE, UMECLIDINIUM BROMIDE AND VILANTEROL TRIFENATATE 200; 62.5; 25 UG/1; UG/1; UG/1
1 POWDER RESPIRATORY (INHALATION)
Qty: 1 | Refills: 0
Start: 2024-08-28 | End: 2024-09-26

## 2024-08-28 RX ADMIN — Medication 25 MILLIGRAM(S): at 21:33

## 2024-08-28 RX ADMIN — TIOTROPIUM BROMIDE INHALATION SPRAY 2 PUFF(S): 3.12 SPRAY, METERED RESPIRATORY (INHALATION) at 08:02

## 2024-08-28 RX ADMIN — BUDESONIDE AND FORMOTEROL FUMARATE 2 PUFF(S): 80; 4.5 AEROSOL, METERED RESPIRATORY (INHALATION) at 10:04

## 2024-08-28 RX ADMIN — Medication 25 MILLIGRAM(S): at 05:45

## 2024-08-28 RX ADMIN — NIFEDIPINE 90 MILLIGRAM(S): 60 TABLET, FILM COATED, EXTENDED RELEASE ORAL at 05:44

## 2024-08-28 RX ADMIN — Medication 200 MILLIGRAM(S): at 05:44

## 2024-08-28 RX ADMIN — Medication 25 MILLIGRAM(S): at 14:21

## 2024-08-28 RX ADMIN — Medication 200 MILLIGRAM(S): at 21:33

## 2024-08-28 RX ADMIN — LOSARTAN POTASSIUM 100 MILLIGRAM(S): 50 TABLET ORAL at 05:45

## 2024-08-28 RX ADMIN — Medication 200 MILLIGRAM(S): at 14:21

## 2024-08-28 NOTE — DISCHARGE NOTE PROVIDER - ATTENDING DISCHARGE PHYSICAL EXAMINATION:
T(C): 36.7 (08-28-24 @ 07:20), Max: 36.8 (08-27-24 @ 15:01)  HR: 78 (08-28-24 @ 05:51) (78 - 84)  BP: 121/75 (08-28-24 @ 07:20) (114/68 - 130/82)  RR: 18 (08-28-24 @ 07:20) (17 - 18)  SpO2: 97% (08-28-24 @ 07:20) (96% - 98%)    O/E:  Awake, alert, not in distress.  HEENT: atraumatic, EOMI.  Chest: clear.  CVS: SIS2 +,  no murmur  P/A: Soft, BS+  CNS: awake, alert  Ext: no edema feet.  All systems reviewed positive findings as above.

## 2024-08-28 NOTE — DISCHARGE NOTE PROVIDER - NSDCMRMEDTOKEN_GEN_ALL_CORE_FT
albuterol 2.5 mg/0.5 mL (0.5%) inhalation solution: 0.5 milliliter(s) by nebulizer once a day as needed for  shortness of breath and/or wheezing  Benicar 40 mg oral tablet: 1 tab(s) orally once a day  hydrALAZINE 25 mg oral tablet: 1 tab(s) orally 3 times a day  hydroCHLOROthiazide 25 mg oral tablet: 1 tab(s) orally once a day  labetalol 100 mg oral tablet: 1 tab(s) orally 2 times a day  Trelegy Ellipta 100 mcg-62.5 mcg-25 mcg/inh inhalation powder: 1 puff(s) inhaled once a day   albuterol 2.5 mg/0.5 mL (0.5%) inhalation solution: 0.5 milliliter(s) by nebulizer once a day as needed for  shortness of breath and/or wheezing  Benicar 40 mg oral tablet: 1 tab(s) orally once a day  hydrALAZINE 25 mg oral tablet: 1 tab(s) orally 3 times a day  hydroCHLOROthiazide 25 mg oral tablet: 1 tab(s) orally once a day  labetalol 200 mg oral tablet: 1 tab(s) orally 3 times a day  Trelegy Ellipta 100 mcg-62.5 mcg-25 mcg/inh inhalation powder: 1 puff(s) inhaled once a day

## 2024-08-28 NOTE — PATIENT PROFILE ADULT - FUNCTIONAL ASSESSMENT - BASIC MOBILITY SECTION LABEL
What Type Of Note Output Would You Prefer (Optional)?: Standard Output How Severe Is Your Skin Lesion?: mild Has Your Skin Lesion Been Treated?: not been treated Is This A New Presentation, Or A Follow-Up?: Skin Lesion .

## 2024-08-28 NOTE — DISCHARGE NOTE PROVIDER - HOSPITAL COURSE
74-year-old male past medical history of COPD current smoker, hypertension, "heart problem", Left sided Schwannoma (dx with CT/PET, last PET shows 3.1 cm in June 2023)  presenting to ED for evaluation of shortness of breath and lightheadedness worse with ambulation for the last 2 days. Otherwise denies any fever, chills, headache, changes in vision, cough, congestion, cp, palpitations,  n/v/d, abd pain, constipation, urinary complaints, lower extremity pain/swelling.    In the ED: Vitals were: BP: 188/105  HR: 71  RR: 18 Temp:   sattting in RA  Labs: troponin 16, BNP 54, Na 127   Imaging: CT chest and head done  T/t: s/p bolus 500 cc  NSxy consulted by ED, needs clearance from medicine.    CT head:   Right middle cranial fossa questionable isodense 3.4 cm mass, not present   January 2021; possible noncalcified meningioma. Apparent mass effect on   subjacent temporal lobe. Further evaluation recommended with MR brain   with and without IV contrast.    MRI head:   1.  Dural-based enhancing mass arising from the anterior wall of the   right middle cranial fossa measuring up to 3.7 cm with prominent dural   thickening/enhancement over the right frontal and temporal convexities.   No significant peritumoral edema. The lesion likely reflects a   meningioma, however is significantly increased in size since the CT head   1/22/21 (retrospectively measured about 1.4 cm on that exam).    MR thoracic spine:     1.  Redemonstrated well marginated mass at the left medial lung apex   measuring about 4 cm.  It demonstrates extension into the left T1-2   neural foramen favoring a neurogenic lesion (such as schwannoma) over   primary lung lesion. Tissue sampling is suggested.  2.  Partially visualized right thyroid lesion, recommend further   evaluation with thyroid sonogram.  3.  Nonspecific heterogeneous marrow signal, with questionable focal   lesion within the T4 vertebral body measuring about 1.8 cm (vs artifact).   Follow-up imaging(MRI) or nuclear medicine bone scan may be obtained.    CTAP:  Numerous densities within the liver including hyper densities and   hypodensities too small to further characterize. Recommend correlation   with MRI abdomen  There are also numerous renal hypodensities. These may also be further   evaluated with MRI    Neurosx planned to do an angiogram with MMA embolization, in addition to meningioma resection.  IR planned to do a CT-guided biopsy of the lung mass.    However, patient and patient's family refused to do any of these procedures, and expressed their wish to be discharged in order to think more about this and then when they decide they would follow up as outpatient to schedule them.    The discharge plan was discussed and approved by Dr. Swift on 8/28/24.     74-year-old male past medical history of COPD current smoker, hypertension, "heart problem", Left sided Schwannoma (dx with CT/PET, last PET shows 3.1 cm in June 2023)  presenting to ED for evaluation of shortness of breath and lightheadedness worse with ambulation for the last 2 days. Otherwise denies any fever, chills, headache, changes in vision, cough, congestion, cp, palpitations,  n/v/d, abd pain, constipation, urinary complaints, lower extremity pain/swelling.    In the ED: Vitals were: BP: 188/105  HR: 71  RR: 18 Temp:   sattting in RA  Labs: troponin 16, BNP 54, Na 127   Imaging: CT chest and head done  T/t: s/p bolus 500 cc    CT head:   Right middle cranial fossa questionable isodense 3.4 cm mass, not present   January 2021; possible noncalcified meningioma. Apparent mass effect on   subjacent temporal lobe. Further evaluation recommended with MR brain   with and without IV contrast.    MRI head:   1.  Dural-based enhancing mass arising from the anterior wall of the   right middle cranial fossa measuring up to 3.7 cm with prominent dural   thickening/enhancement over the right frontal and temporal convexities.   No significant peritumoral edema. The lesion likely reflects a   meningioma, however is significantly increased in size since the CT head   1/22/21 (retrospectively measured about 1.4 cm on that exam).    MR thoracic spine:     1.  Redemonstrated well marginated mass at the left medial lung apex   measuring about 4 cm.  It demonstrates extension into the left T1-2   neural foramen favoring a neurogenic lesion (such as schwannoma) over   primary lung lesion. Tissue sampling is suggested.  2.  Partially visualized right thyroid lesion, recommend further   evaluation with thyroid sonogram.  3.  Nonspecific heterogeneous marrow signal, with questionable focal   lesion within the T4 vertebral body measuring about 1.8 cm (vs artifact).   Follow-up imaging(MRI) or nuclear medicine bone scan may be obtained.    CTAP:  Numerous densities within the liver including hyper densities and   hypodensities too small to further characterize. Recommend correlation   with MRI abdomen  There are also numerous renal hypodensities. These may also be further   evaluated with MRI    Neurosx planned to do an angiogram with MMA embolization, in addition to meningioma resection.  IR planned to do a CT-guided biopsy of the lung mass.    However, patient and patient's family refused to do any of these procedures, and expressed their wish to be discharged in order to think more about this and then when they decide they would follow up as outpatient to schedule them.    # Hyponatremia resolved       74-year-old male past medical history of COPD current smoker, hypertension, "heart problem", Left sided Schwannoma (dx with CT/PET, last PET shows 3.1 cm in June 2023)  presenting to ED for evaluation of shortness of breath and lightheadedness worse with ambulation for the last 2 days. Otherwise denies any fever, chills, headache, changes in vision, cough, congestion, cp, palpitations,  n/v/d, abd pain, constipation, urinary complaints, lower extremity pain/swelling.    In the ED: Vitals were: BP: 188/105  HR: 71  RR: 18 Temp:   sattting in RA  Labs: troponin 16, BNP 54, Na 127   Imaging: CT chest and head done  T/t: s/p bolus 500 cc    CT head:   Right middle cranial fossa questionable isodense 3.4 cm mass, not present   January 2021; possible noncalcified meningioma. Apparent mass effect on   subjacent temporal lobe. Further evaluation recommended with MR brain   with and without IV contrast.    MRI head:   1.  Dural-based enhancing mass arising from the anterior wall of the   right middle cranial fossa measuring up to 3.7 cm with prominent dural   thickening/enhancement over the right frontal and temporal convexities.   No significant peritumoral edema. The lesion likely reflects a   meningioma, however is significantly increased in size since the CT head   1/22/21 (retrospectively measured about 1.4 cm on that exam).    MR thoracic spine:     1.  Redemonstrated well marginated mass at the left medial lung apex   measuring about 4 cm.  It demonstrates extension into the left T1-2   neural foramen favoring a neurogenic lesion (such as schwannoma) over   primary lung lesion. Tissue sampling is suggested.  2.  Partially visualized right thyroid lesion, recommend further   evaluation with thyroid sonogram.  3.  Nonspecific heterogeneous marrow signal, with questionable focal   lesion within the T4 vertebral body measuring about 1.8 cm (vs artifact).   Follow-up imaging(MRI) or nuclear medicine bone scan may be obtained.    CTAP:  Numerous densities within the liver including hyper densities and   hypodensities too small to further characterize. Recommend correlation   with MRI abdomen  There are also numerous renal hypodensities. These may also be further   evaluated with MRI    Neurosx planned to do an angiogram with MMA embolization, in addition to meningioma resection.  IR planned to do a CT-guided biopsy of the lung mass.    However, patient and patient's family refused to do any of these procedures, and expressed their wish to be discharged in order to think more about this and then when they decide they would follow up as outpatient to schedule them.    # Hyponatremia resolved    #Uncontrolled HTN  Labetalol dose increased, in addition to hydralazine, nifedipine and ACEi

## 2024-08-28 NOTE — CHART NOTE - NSCHARTNOTEFT_GEN_A_CORE
Patient is for tumor / MMA embolization today with the neuroendovascular team.   Please keep NPO except medications on IVF as tolerated in preparation for procedure.   [phone]

## 2024-08-28 NOTE — CHART NOTE - NSCHARTNOTEFT_GEN_A_CORE
I translated for DR. Neumann this morning regarding surgical intervention for the brain tumor resection, we explained to the pt and his family ( wife and daughter) in great length regarding the risks and benefits of the surgery for diagnosis and treatment.  After they have a family meeting and decided to delay the surgery at this time.  Again, I told them the risks of delaying the surgery also will delay the diagnosis and treatment plan,  and the tumor will grow bigger and might lead to severe neurological deficit.  They stated they understand and want to wait for all family members are back in [place] and then plan for the surgery.  I told them should follow up in Dr. Neumann office once they change their mind and decided to proceed with the surgery. I translated in Chinese for DR. Neumann this morning regarding surgical intervention for the brain tumor resection, we explained to the pt and his family ( wife and daughter) in great length regarding the risks and benefits of the surgery for diagnosis and treatment.  After they have a family meeting and decided to delay the surgery at this time.  Again, I told them the risks of delaying the surgery also will delay the diagnosis and treatment plan,  and the tumor will grow bigger and might lead to severe neurological deficit.  They stated they understand and want to wait for all family members are back in [place] and then plan for the surgery.  I told them should follow up in Dr. Neumann office once they change their mind and decided to proceed with the surgery. I translated in Chinese for DR. Neumann, per patient request, this morning regarding surgical intervention for the brain tumor resection, we explained to the pt and his family ( wife and daughter) in great length regarding the risks and benefits of the surgery for diagnosis and treatment.  After they have a family meeting and decided to delay the surgery at this time.  Again, I told them the risks of delaying the surgery also will delay the diagnosis and treatment plan,  and the tumor will grow bigger and might lead to severe neurological deficit, seizure, coma, death.  They stated they understand and want to wait for all family members are back in [place] and then plan for the surgery.  I told them should follow up in Dr. Neumann office once they change their mind and decided to proceed with the surgery.

## 2024-08-28 NOTE — DISCHARGE NOTE PROVIDER - CARE PROVIDERS DIRECT ADDRESSES
[email or web address]

## 2024-08-28 NOTE — DISCHARGE NOTE PROVIDER - NSDCCPCAREPLAN_GEN_ALL_CORE_FT
PRINCIPAL DISCHARGE DIAGNOSIS  Diagnosis: Intracranial meningioma  Assessment and Plan of Treatment: You were found to have a tumor in your brain, called meningioma, which is the most likely cause of your dizziness. Neurosurgery were consulted, and they were planning to do a procedure called angiogram with middle meningeal artery embolization, followed by resection of this tumor. However, you refused to undergo these procedures and you preferred to be discharge and think about it further before deciding. When you take the decision, you will have to follow up as outpatient with the neurosurgery attending in order to schedule the procedures. You need to come back immediately in case of any change in mental status, fever, seizure.      SECONDARY DISCHARGE DIAGNOSES  Diagnosis: Lung mass  Assessment and Plan of Treatment: You were found to have a lung mass as well, which the report says it is most likely to be a schwannoma (a type of tumors from nerve sheath). However in order to confirm this we need to do a tissue biopsy with pathology sent. This was the plan, with the interventional radiology team, to do a biopsy under imaging guidance. However you refused to undergo this procedure and you preferred to be discharge and think about it further before deciding. When you take the decision, you will have to follow up as outpatient with the interventional radiology attending in order to schedule the procedure.     PRINCIPAL DISCHARGE DIAGNOSIS  Diagnosis: Intracranial meningioma  Assessment and Plan of Treatment: You were found to have a tumor in your brain, called meningioma, which is the most likely cause of your dizziness. Neurosurgery were consulted, and they were planning to do a procedure called angiogram with middle meningeal artery embolization, followed by resection of this tumor. However, you refused to undergo these procedures and you preferred to be discharge and think about it further before deciding. When you take the decision, you will have to follow up as outpatient with the neurosurgery attending in order to schedule the procedures. You need to come back immediately in case of any change in mental status, fever, seizure.      SECONDARY DISCHARGE DIAGNOSES  Diagnosis: Lung mass  Assessment and Plan of Treatment: You were found to have a lung mass as well, which the report says it is most likely to be a schwannoma (a type of tumors from nerve sheath). However in order to confirm this we need to do a tissue biopsy with pathology sent. This was the plan, with the interventional radiology team, to do a biopsy under imaging guidance. However you refused to undergo this procedure and you preferred to be discharge and think about it further before deciding. When you take the decision, you will have to follow up as outpatient with the interventional radiology attending in order to schedule the procedure.    Diagnosis: Uncontrolled hypertension  Assessment and Plan of Treatment: You had elevated blood pressure readings when you were admitted. We increased the dose of 1 of your anti- hypertension medications and your blood pressure was better controlled. So you will need to take your medications that we prescibed regularly, with the increased dose of labetalol. You need to chart your BP at home to make sure it is controlled, and then you need to follow up with your primary care physician Dr. Vick.    Diagnosis: Thyroid lesion  Assessment and Plan of Treatment: A thyroid lesion was incidentally found on the CT, not otherwise determined. You will need to do a thyroid ultrasound as outpatient in order to better visualize this lesion and determine what it is. After that you will follow up either with your primary physician Dr. Vick or with an endocrinologist.

## 2024-08-28 NOTE — DISCHARGE NOTE PROVIDER - CARE PROVIDER_API CALL
Michael Choe  Interventional Radiology and Diagnostic Radiology  [address] [place] [address]  Phone: [phone]  Fax: [phone]  Follow Up Time: 2 weeks    Meño Neumann  Neurosurgery  [address], [address]  [place] [address]  Phone: [phone]  Fax: [phone]  Follow Up Time: 2 weeks    Brice Vick  Internal Medicine  [address] [place] [address]  Phone: [phone]  Fax: [phone]  Follow Up Time: 2 weeks   Michael Choe  Interventional Radiology and Diagnostic Radiology  [address] [place] [address]  Phone: [phone]  Fax: [phone]  Follow Up Time: 2 weeks    Meño Neumann  Neurosurgery  [address] [facility] [facility], [address]  [place] [address]  Phone: [phone]  Fax: [phone]  Follow Up Time: 2 weeks    Brice Vick  Internal Medicine  [address] [place] [address]  Phone: [phone]  Fax: [phone]  Follow Up Time: 1 week   Michael Choe  Interventional Radiology and Diagnostic Radiology  [address] [place] [address]  Phone: [phone]  Fax: [phone]  Follow Up Time: 2 weeks    Meño Neumann  Neurosurgery  [address] [facility] [facility], [address]  [place] [address]  Phone: [phone]  Fax: [phone]  Follow Up Time: 2 weeks    Brice Vick  Internal Medicine  [address] [place] [address]  Phone: [phone]  Fax: [phone]  Follow Up Time: 1 week    Damian Barahona  Pulmonary Disease  [address] [place] [address]  Phone: [phone]  Fax: [phone]  Follow Up Time: 2 weeks

## 2024-08-28 NOTE — CHART NOTE - NSCHARTNOTEFT_GEN_A_CORE
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   [facility]  # [id number] used for encounter.   Patient and family would like to defer procedural planning at this time and wish to follow up outpatient after further consideration.   Explained to the patient/family at length that delaying the procedures/ surgeries will therefore delay potential treatment options for the patient and could also risk tumor growth/ spread.   Patient and family continue to insist they wish to have more time to discuss any potential procedural intervention with their family. They agree to follow up outpatient.   Dr Clemons aware.   [phone]

## 2024-08-28 NOTE — CHART NOTE - NSCHARTNOTEFT_GEN_A_CORE
I discussed with the patient and patient's wife the findings on imaging and the planned procedures.  They said that they don't want to proceed with any investigation or procedure for now, and they want to think about it.  I explained to them the risks of delaying diagnosis and treatment of both tumors and they expressed their understanding.   They requested to be discharged, saying that they will think about it and once they make a decision they will follow up as outpatient with the corresponding physicians.    This conversation was done with the help of a cantonese , Benny, #[id number].

## 2024-08-28 NOTE — DISCHARGE NOTE PROVIDER - PROVIDER TOKENS
PROVIDER:[TOKEN:[[id number]],FOLLOWUP:[2 weeks]],PROVIDER:[TOKEN:[[id number]],FOLLOWUP:[2 weeks]],PROVIDER:[TOKEN:[[id number]],FOLLOWUP:[2 weeks]] PROVIDER:[TOKEN:[[id number]],FOLLOWUP:[2 weeks]],PROVIDER:[TOKEN:[[id number]],FOLLOWUP:[2 weeks]],PROVIDER:[TOKEN:[[id number]],FOLLOWUP:[1 week]] PROVIDER:[TOKEN:[[id number]],FOLLOWUP:[2 weeks]],PROVIDER:[TOKEN:[[id number]],FOLLOWUP:[2 weeks]],PROVIDER:[TOKEN:[[id number]],FOLLOWUP:[1 week]],PROVIDER:[TOKEN:[[id number]],FOLLOWUP:[2 weeks]]

## 2024-08-29 ENCOUNTER — TRANSCRIPTION ENCOUNTER (OUTPATIENT)
Age: 74
End: 2024-08-29

## 2024-08-29 VITALS
SYSTOLIC BLOOD PRESSURE: 110 MMHG | RESPIRATION RATE: 18 BRPM | OXYGEN SATURATION: 97 % | DIASTOLIC BLOOD PRESSURE: 68 MMHG | TEMPERATURE: 97 F | HEART RATE: 79 BPM

## 2024-08-29 RX ADMIN — Medication 200 MILLIGRAM(S): at 05:51

## 2024-08-29 RX ADMIN — Medication 25 MILLIGRAM(S): at 05:50

## 2024-08-29 RX ADMIN — LOSARTAN POTASSIUM 100 MILLIGRAM(S): 50 TABLET ORAL at 05:51

## 2024-08-29 RX ADMIN — NIFEDIPINE 90 MILLIGRAM(S): 60 TABLET, FILM COATED, EXTENDED RELEASE ORAL at 05:50

## 2024-08-29 NOTE — PROGRESS NOTE ADULT - ASSESSMENT
74M PMHx COPD, tobacco use (>100 pack years), HTN, and L temporal mass (6/2023 PET showed 3.1cm, schwannoma vs non-calcified meningioma) who presented to ED on 8/26 with 2 days dizziness and difficulty with ambulation. CTA chest demonstrated a L apical 4 x 3 cm mass, with possible continuation to upper thoracic neuroforamen with c/f neurogenic mass vs pancoast tumor. Thoracic surgery consulted for evaluation for mass biopsy. Patient has been differing multiple diagnostic procedures as outpatient. Planned for image guided lung biopsy today, now NPO and preoped.     PLAN:  - NPO  - Possible image guided lung biopsy today  - monitor vital signs  - monitor inputs and outputs  - monitor labs and replete as necessary    *plan to be discussed in AM with attending*

## 2024-08-29 NOTE — PROGRESS NOTE ADULT - SUBJECTIVE AND OBJECTIVE BOX
GENERAL SURGERY PROGRESS NOTE    Patient: MATTIE FERGUSON , 74y ([birth year])Male   MRN: [id number]  Location: [address]  Visit: 08-26-24 Inpatient  Date: 08-28-24 @ 04:43    Hospital Day #:3    Procedure/Dx/Injuries: Left apical lung mass    Events of past 24 hours: Patient seen and examined at bedside.  No acute events overnight. Labs were all normal.     PAST MEDICAL & SURGICAL HISTORY:  Hypertension  COPD, mild  Thoracic aortic aneurysm  Chronic hyponatremia  Emphysema/COPD  No significant past surgical history    Vitals:   T(F): 97.6 (08-27-24 @ 15:27), Max: 98.5 (08-27-24 @ 11:43)  HR: 82 (08-27-24 @ 22:02)  BP: 121/78 (08-27-24 @ 22:02)  RR: 18 (08-27-24 @ 15:27)  SpO2: 96% (08-27-24 @ 15:27)      Diet, NPO after Midnight:      NPO Start Date: 27-Aug-2024,   NPO Start Time: 23:59  Diet, DASH/TLC:   Sodium & Cholesterol Restricted      Fluids:     I & O's:    PHYSICAL EXAM:  GENERAL: A&Ox3, NAD  HEENT: Normocephalic, atraumatic  PULM: Non-labored respirations, bilateral chest rise, saturating well on RA  CV: RRR  ACBDOMEN: Abdomen soft, non-distended, non-tender  SKIN: Warm, dry, normal skin color, texture, turgor      MEDICATIONS  (STANDING):  budesonide  80 MICROgram(s)/formoterol 4.5 MICROgram(s) Inhaler 2 Puff(s) Inhalation two times a day  enoxaparin Injectable 40 milliGRAM(s) SubCutaneous every 24 hours  hydrALAZINE 25 milliGRAM(s) Oral three times a day  labetalol 200 milliGRAM(s) Oral three times a day  losartan 100 milliGRAM(s) Oral daily  NIFEdipine XL 90 milliGRAM(s) Oral daily  sodium chloride 0.9%. 1000 milliLiter(s) (75 mL/Hr) IV Continuous <Continuous>  tiotropium 2.5 MICROgram(s) Inhaler 2 Puff(s) Inhalation daily    MEDICATIONS  (PRN):    DVT PROPHYLAXIS: enoxaparin Injectable 40 milliGRAM(s) SubCutaneous every 24 hours    LAB/STUDIES:  Labs:  CAPILLARY BLOOD GLUCOSE                          15.1   6.24  )-----------( 284      ( 27 Aug 2024 09:35 )             44.1       Auto Neutrophil %: 58.3 % (08-27-24 @ 09:35)  Auto Immature Granulocyte %: 0.3 % (08-27-24 @ 09:35)    08-27    135  |  99  |  13  ----------------------------<  154<H>  4.4   |  29  |  0.9      Calcium: 8.7 mg/dL (08-27-24 @ 09:35)      LFTs:             6.7  | 1.0  | 14       ------------------[54      ( 27 Aug 2024 09:35 )  4.1  | x    | 19             Blood Gas Venous - Lactate: 0.8 mmol/L (08-26-24 @ 00:53)      Coags:     11.80  ----< 1.03    ( 28 Aug 2024 01:08 )     36.8       Urinalysis Basic - ( 27 Aug 2024 09:35 )    Color: x / Appearance: x / SG: x / pH: x  Gluc: 154 mg/dL / Ketone: x  / Bili: x / Urobili: x   Blood: x / Protein: x / Nitrite: x   Leuk Esterase: x / RBC: x / WBC x   Sq Epi: x / Non Sq Epi: x / Bacteria: x      IMAGING:  FINDINGS:    LOWER CHEST: Bibasilar subsegmental atelectasis. Emphysematous change    HEPATOBILIARY: There are numerous hyper and hypodensities which are too   small to characterize. . There is no biliary ductal dilatation.    SPLEEN: Within normal limits.    ADRENALS: Within normal limits.    PANCREAS: Within normal limits.    KIDNEYS: Symmetric renal enhancement. No hydronephrosis. There are   numerous renal hypodensities include both cysts and lesions which are too   small to characterize as well as incompletely characterized hypodensities   for example 1.2 cm incompletely characterized left renal hypodensity,   3-57. Contrast within the renal collecting system and ureter.    ABDOMINOPELVIC NODES: No enlarged abdominal or pelvic lymph nodes.    PELVIC ORGANS: Underdistended urinary bladder, limiting evaluation.    PERITONEUM/MESENTERY/BOWEL: No bowel obstruction, ascites or free   intraperitoneal air. The appendix is normal caliber.    BONES/SOFT TISSUES: Degenerative changes of the spine.    IMPRESSION:  Numerous densities within the liver including hyper densities and   hypodensities too small to further characterize. Recommend correlation   with MRI abdomen    There are also numerous renal hypodensities. These may also be further   evaluated with MRI      ASSESSMENT:  S/P: LEFT BREAST CHRONIC MASTITIS, POSS SMALL ABSCESS  NEUROLOGY WAS CONSULTED FOR DIZZENESS. CTH     PLAN:  1- NPO FOR THE IR LUNG BIOBSY ON 08/29  2- F/U IR   3- F/U NEUROENDOCRINE PLANS FOR A POSSIBLE CEREBRAL ANGIOGRAM OF A 3.4 CM NON-CALCIFIED MASS ON CTH ON 08/28    GREEN SURGERY SPECTRA: [id number]
GENERAL SURGERY PROGRESS NOTE    Patient: MATTIE FERGUSON , 74y ([birth year])Male   MRN: [id number]  Location: [address]  Visit: 08-26-24 Inpatient  Date: 08-29-24 @ 03:30    Hospital Day #: 4    Procedure/Dx/Injuries: Left apical lung mass    Events of past 24 hours:  Patient seen and examined at bedside.  Patient NPO, no nausea, no vomit, no pain. Today possibly going for image guided lung biopsy (patient believes the procedure is on Friday), all preop labs are on file. yesterday patient differed neurosurgery workup,     PAST MEDICAL & SURGICAL HISTORY:  Hypertension      COPD, mild      Thoracic aortic aneurysm      Chronic hyponatremia      Emphysema/COPD      No significant past surgical history          Vitals:   T(F): 97.8 (08-29-24 @ 00:52), Max: 98.1 (08-28-24 @ 05:51)  HR: 87 (08-29-24 @ 00:52)  BP: 124/81 (08-29-24 @ 00:52)  RR: 17 (08-29-24 @ 00:52)  SpO2: 97% (08-28-24 @ 07:20)      Diet, NPO after Midnight:      NPO Start Date: 27-Aug-2024,   NPO Start Time: 23:59  Diet, DASH/TLC:   Sodium & Cholesterol Restricted      Fluids:     I & O's:    PHYSICAL EXAM:  GENERAL: A&Ox3, NAD  HEENT: Normocephalic, atraumatic  PULM: Non-labored respirations, bilateral chest rise, saturating well on RA  CV: RRR  ACBDOMEN: Abdomen soft, non-distended, non-tender  SKIN: Warm, dry, normal skin color, texture, turgor    MEDICATIONS  (STANDING):  budesonide  80 MICROgram(s)/formoterol 4.5 MICROgram(s) Inhaler 2 Puff(s) Inhalation two times a day  hydrALAZINE 25 milliGRAM(s) Oral three times a day  labetalol 200 milliGRAM(s) Oral three times a day  losartan 100 milliGRAM(s) Oral daily  NIFEdipine XL 90 milliGRAM(s) Oral daily  sodium chloride 0.9%. 1000 milliLiter(s) (75 mL/Hr) IV Continuous <Continuous>  tiotropium 2.5 MICROgram(s) Inhaler 2 Puff(s) Inhalation daily    MEDICATIONS  (PRN):      DVT PROPHYLAXIS:   GI PROPHYLAXIS:   ANTICOAGULATION:   ANTIBIOTICS:            LAB/STUDIES:  Labs:  CAPILLARY BLOOD GLUCOSE                              15.1   9.82  )-----------( 293      ( 28 Aug 2024 05:10 )             44.7       Auto Neutrophil %: 63.0 % (08-28-24 @ 05:10)  Auto Immature Granulocyte %: 0.5 % (08-28-24 @ 05:10)    08-28    137  |  101  |  16  ----------------------------<  105<H>  3.9   |  22  |  0.9      Calcium: 8.6 mg/dL (08-28-24 @ 05:10)      LFTs:             6.7  | 0.8  | 13       ------------------[53      ( 28 Aug 2024 05:10 )  4.1  | x    | 18          Lipase:x      Amylase:x             Coags:     11.80  ----< 1.03    ( 28 Aug 2024 01:08 )     36.8                Urinalysis Basic - ( 28 Aug 2024 05:10 )    Color: x / Appearance: x / SG: x / pH: x  Gluc: 105 mg/dL / Ketone: x  / Bili: x / Urobili: x   Blood: x / Protein: x / Nitrite: x   Leuk Esterase: x / RBC: x / WBC x   Sq Epi: x / Non Sq Epi: x / Bacteria: x  
THORACIC SURGERY PROGRESS NOTE     Patient: MATTIE FERGUSON , 74y ([birth year])Male   MRN: [id number]  Location: [facility] ED Hold 036 A  Visit: 08-26-24 Inpatient  Date: 08-27-24 @ 10:37        Admitted :08-26-24 (1d)  LOS: 1d    Procedure/Dx/Injuries: Intracranial meningioma        Events of past 24 hours:   Patient seen and examined at bedside.   No acute events overnight. Sating well on RA.   >>> <<<>>> <<<>>> <<<>>> <<<>>> <<<>>> <<<>>> <<<>>> <<<>>> <<<>>> <<<    Vitals:   T(F): 97.6 (08-27-24 @ 10:00), Max: 98.2 (08-26-24 @ 19:03)  HR: 85 (08-27-24 @ 07:42)  BP: 177/95 (08-27-24 @ 10:00)  RR: 18 (08-27-24 @ 10:00)  SpO2: 95% (08-27-24 @ 10:00)      PHYSICAL EXAM:  GENERAL: A&Ox3, NAD  HEENT: Normocephalic, atraumatic  PULM: Non-labored respirations, bilateral chest rise, saturating well on RA  CV: Regular rate and rhythm  ACBDOMEN: Abdomen soft, non-distended, non-tender  SKIN: Warm, dry, normal skin color, texture, turgor    >>> <<<>>> <<<>>> <<<>>> <<<>>> <<<>>> <<<>>> <<<>>> <<<>>> <<<>>> <<<   Is & Os:   Diet, DASH/TLC:   Sodium & Cholesterol Restricted    Fluids: sodium chloride 0.9%.: Solution, 1000 milliLiter(s) infuse at 75 mL/Hr, Stop After 12 Hours        Bowel Movement: :   Flatus: :   >>> <<<>>> <<<>>> <<<>>> <<<>>> <<<>>> <<<>>> <<<>>> <<<>>> <<<>>> <<<    MEDICATIONS  (STANDING):  budesonide  80 MICROgram(s)/formoterol 4.5 MICROgram(s) Inhaler 2 Puff(s) Inhalation two times a day  enoxaparin Injectable 40 milliGRAM(s) SubCutaneous every 24 hours  hydrALAZINE 25 milliGRAM(s) Oral three times a day  labetalol 200 milliGRAM(s) Oral three times a day  losartan 100 milliGRAM(s) Oral daily  NIFEdipine XL 90 milliGRAM(s) Oral daily  sodium chloride 0.9%. 1000 milliLiter(s) (75 mL/Hr) IV Continuous <Continuous>  tiotropium 2.5 MICROgram(s) Inhaler 2 Puff(s) Inhalation daily    MEDICATIONS  (PRN):      DVT PROPHYLAXIS: enoxaparin Injectable 40 milliGRAM(s) SubCutaneous every 24 hours    GI PROPHYLAXIS:   ANTICOAGULATION:   ANTIBIOTICS:      >>> <<<>>> <<<>>> <<<>>> <<<>>> <<<>>> <<<>>> <<<>>> <<<>>> <<<>>> <<<        LAB/STUDIES:  Labs:  CAPILLARY BLOOD GLUCOSE                              15.1   6.24  )-----------( 284      ( 27 Aug 2024 09:35 )             44.1       Auto Neutrophil %: 58.3 % (08-27-24 @ 09:35)  Auto Immature Granulocyte %: 0.3 % (08-27-24 @ 09:35)    08-26    127<L>  |  91<L>  |  20  ----------------------------<  138<H>  3.5   |  24  |  0.9          LFTs:             6.8  | 0.4  | 14       ------------------[52      ( 26 Aug 2024 01:23 )  4.4  | x    | 19          Lipase:x      Amylase:x         Blood Gas Venous - Lactate: 0.8 mmol/L (08-26-24 @ 00:53)      Coags:            Urinalysis Basic - ( 26 Aug 2024 01:23 )    Color: x / Appearance: x / SG: x / pH: x  Gluc: 138 mg/dL / Ketone: x  / Bili: x / Urobili: x   Blood: x / Protein: x / Nitrite: x   Leuk Esterase: x / RBC: x / WBC x   Sq Epi: x / Non Sq Epi: x / Bacteria: x              >>> <<<>>> <<<>>> <<<>>> <<<>>> <<<>>> <<<>>> <<<>>> <<<>>> <<<>>> <<<  ASSESSMENT:  74M PMHx COPD, tobacco use (>100 pack years), HTN, and L temporal mass (6/2023 PET showed 3.1cm, schwannoma vs non-calcified meningioma) who presented to ED on 8/26 with 2 days dizziness and difficulty with ambulation. CTA chest demonstrated a L apical 4 x 3 cm mass, with possible continuation to upper thoracic neuroforamen with c/f neurogenic mass vs pancoast tumor. Thoracic surgery consulted for evaluation for mass biopsy.    - Possible IR biopsy to evaluate the mass   - F/u MRI brain, CTA head and neck, metastasis workup with CTAP, medicine/pulm workup, oncology consult      GREEN TEAM SPECTRA 8036
MATTIE FERGUSON  74y, Male  Allergy: No Known Allergies    Hospital Day: 1d    Patient seen and examined earlier today.  Juwan used for IPAD translation to cantonese, me and resident present. also his wife was there during the questions. Denies any current symptoms, pending lung bx.       LAST 24-Hr EVENTS:    VITALS:  T(F): 98.2 (08-27-24 @ 15:01), Max: 98.5 (08-27-24 @ 11:43)  HR: 84 (08-27-24 @ 15:01)  BP: 128/74 (08-27-24 @ 15:01) (128/74 - 185/91)  RR: 17 (08-27-24 @ 15:01)  SpO2: 98% (08-27-24 @ 15:01)    PHYSICAL EXAM:    Physical exam:   GENERAL: No acute distress, non-toxic appearing.  HEAD:  Normal with no signs of head trauma.  EYES:  PERRLA, EOMI, conjunctiva normal, Anicteric  ENT:  Mucosa Moist  NECK:  Supple, no tenderness, no bruits  LUNGS:  Clear breath sounds bilaterally.  No wheezes, rales, or rhonchi.  HEART:  Regular rate and rhythm. Normal S1 and S2, without murmurs, rub or gallop.  VASC: No edema. Peripheral pulses normal and equal in all extremities.  ABD:  Bowel sounds normal, soft, nontender, no masses, no organomegaly.  EXT: no clubbing, no cyanosis.  NEURO: Alert and oriented x 3. Normal affect. Cranial nerves intact. No focal     TESTS & MEASUREMENTS:  Weight/Height/BMI  Height (cm): 170.2 (08-26-24 @ 00:18)  Weight (kg): 74.8 (08-26-24 @ 00:18)  BMI (kg/m2): 25.8 (08-26-24 @ 00:18)                          15.1   6.24  )-----------( 284      ( 27 Aug 2024 09:35 )             44.1         08-27    135  |  99  |  13  ----------------------------<  154<H>  4.4   |  29  |  0.9    Ca    8.7      27 Aug 2024 09:35  Mg     2.3     08-27    TPro  6.7  /  Alb  4.1  /  TBili  1.0  /  DBili  x   /  AST  14  /  ALT  19  /  AlkPhos  54  08-27    LIVER FUNCTIONS - ( 27 Aug 2024 09:35 )  Alb: 4.1 g/dL / Pro: 6.7 g/dL / ALK PHOS: 54 U/L / ALT: 19 U/L / AST: 14 U/L / GGT: x             Troponin T, High Sensitivity Result: 16 ng/L (08-26-24 @ 03:42)  Troponin T, High Sensitivity Result: 15 ng/L (08-26-24 @ 01:23)      Urinalysis Basic - ( 27 Aug 2024 09:35 )    Color: x / Appearance: x / SG: x / pH: x  Gluc: 154 mg/dL / Ketone: x  / Bili: x / Urobili: x   Blood: x / Protein: x / Nitrite: x   Leuk Esterase: x / RBC: x / WBC x   Sq Epi: x / Non Sq Epi: x / Bacteria: x    RADIOLOGY, ECG, & ADDITIONAL TESTS:  12 Lead ECG:   Ventricular Rate 71 BPM    Atrial Rate 71 BPM    P-R Interval 228 ms    QRS Duration 100 ms    Q-T Interval 402 ms    QTC Calculation(Bazett) 436 ms    P Axis 55 degrees    R Axis 46 degrees    T Axis 13 degrees    Diagnosis Line Sinus rhythm with 1st degree A-V block  Left ventricular hypertrophy with repolarization abnormality  Abnormal ECG    Confirmed by Eva Richardson MD ([id number]) on 8/26/2024 8:22:38 AM (08-26-24 @ 02:15)    CT Angio Chest PE Protocol w/ IV Cont:   ACC: [id number] EXAM:  CT ANGIO CHEST PULM [facility]   ORDERED BY: BRIGIDA ROQUE     PROCEDURE DATE:  08/26/2024          INTERPRETATION:  CLINICAL HISTORY/REASON FOR EXAM: Shortness of breath.    TECHNIQUE: Multislice helical sections were obtained from the thoracic   inlet to the lung bases during rapid administration of 65 cc Omnipaque   350 intravenous contrast. Thin sections were reconstructed through the   pulmonary vasculature. 3D (MIP) reformats obtained. 35 cc contrast   discarded    COMPARISON: None.    FINDINGS:    PULMONARY EMBOLUS: No evidence of acute pulmonary embolism.    LUNGS, PLEURA, AIRWAYS:    Left apical 4 x 3 cm mass, with possible continuation to upper thoracic   neuroforamen; neurogenic mass is a consideration in addition to lung   Pancoast tumor (series 7, image 21)    No lobar consolidation, effusion, or pneumothorax. No evidence of central   endobronchial obstruction. No bronchiectasis or honeycombing.   Centrilobular and paraseptal emphysema. Bilateral subsegmental   atelectasis.    THORACIC NODES: No mediastinal, hilar, supraclavicular, or axillary   lymphadenopathy.    MEDIASTINUM/GREAT VESSELS: No pericardial effusion. Heart size is within   normal limits. Mild aneurysmal dilation of ascending thoracic aorta up to   4.2 cm.. Aortic calcifications.    BONES/SOFT TISSUES: Unremarkable.    VISUALIZED UPPER ABDOMEN: Unremarkable.      IMPRESSION:    Left apical 4 x 3 cm mass, with possible continuation to upper thoracic   neuroforamen; neurogenic massis a consideration in addition to lung   Pancoast tumor in patient with emphysema. MR thoracic spine with IV   contrast is recommended for initial evaluation.    No evidence of acute thoracic pathology or pulmonary embolism.    --- End of Report ---  LUIS MIGUEL TRAN MD; Attending Radiologist  This document has been electronically signed. Aug 26 2024  6:10AM (08-26-24 @ 05:53)  CT Head No Cont:   ACC: [id number] EXAM:  CT BRAIN   ORDERED BY: BRIGIDA ROQUE     PROCEDURE DATE:  08/26/2024        INTERPRETATION:  Clinical History / Reason for exam: Lightheadedness.    Technique: CT head without IV contrast performed. Multiple axial CT   imagesof the head were obtained from the base of the skull to the vertex   without the administration of IV contrast. Sagittal and coronal reformats   were obtained.    Comparison: CT head 1/22/2021.    Findings:    Right middle cranial fossa questionable isodense 3.4 cm mass, not present   January 2021; possible noncalcified meningioma (series 6, image 41).   Apparent mass effect on subjacent temporal lobe.    Ventricular system, basal cisterns and sulcal patterns are symmetric and   appropriate for patient's age.    No acute extra-axial collection.  No midline shift or acute intracranial   hemorrhage.    Patchy hypodensities in the periventricular and subcortical white matter   without mass effect compatible with chronic microvascular changes.    No depressed skull fracture.    Paranasal sinuses mucosal thickening. The mastoid air cells are   well-aerated.      Impression:    Right middle cranial fossa questionable isodense 3.4 cm mass, not present   January 2021; possible noncalcified meningioma. Apparent mass effect on   subjacent temporal lobe. Further evaluation recommended with MR brain   with and without IV contrast.    No evidence of acute intracranial abnormality.  LUIS MIGUEL TRAN MD; Attending Radiologist  This document has been electronically signed. Aug 26 2024  5:58AM (08-26-24 @ 05:44)    RECENT DIAGNOSTIC ORDERS:  IR Procedure: Routine  Transport: [facility]  Provider's Contact #: [phone] (08-27-24 @ 12:25)  NM Bone Imaging Total: 17:50  Exam in Progress (08-27-24 @ 12:11)  Activated Partial Thromboplastin Time:  Start Date:27-Aug-2024. AM Sched. Collection:28-Aug-2024 04:30 (08-27-24 @ 11:41)  Prothrombin Time and INR, Plasma:  Start Date:27-Aug-2024. AM Sched. Collection:28-Aug-2024 04:30 (08-27-24 @ 11:41)  Basic Metabolic Panel: AM Sched. Collection: 28-Aug-2024 04:30 (08-27-24 @ 11:41)  Complete Blood Count: AM Sched. Collection: 28-Aug-2024 04:30 (08-27-24 @ 11:41)  Magnesium: AM Sched. Collection: 28-Aug-2024 04:30 (08-27-24 @ 09:51)  Comprehensive Metabolic Panel: AM Sched. Collection: 28-Aug-2024 04:30 (08-27-24 @ 09:51)  Complete Blood Count + Automated Diff: AM Sched. Collection: 28-Aug-2024 04:30 (08-27-24 @ 09:51)   Thyroid: Routine   Indication: r/o nodule vs metastatic lesion  Transport: [facility] (08-27-24 @ 08:36)  Cortisol AM, Serum: AM Sched. Collection: 27-Aug-2024 04:30 (08-26-24 @ 17:51)  Thyroid Stimulating Hormone, Serum: AM Sched. Collection: 27-Aug-2024 04:30 (08-26-24 @ 17:51)  Potassium, Random Urine: STAT (08-26-24 @ 17:06)  Urea Nitrogen,  Random Urine: STAT (08-26-24 @ 17:06)  Osmolality, Random Urine: STAT (08-26-24 @ 17:06)  Protein/Creatinine Ratio, Urine: STAT (08-26-24 @ 17:06)  Sodium, Random Urine: STAT (08-26-24 @ 17:06)  Urinalysis: STAT (08-26-24 @ 17:06)  Diet, DASH/TLC:   Sodium & Cholesterol Restricted (08-26-24 @ 17:04)      MEDICATIONS:  MEDICATIONS  (STANDING):  budesonide  80 MICROgram(s)/formoterol 4.5 MICROgram(s) Inhaler 2 Puff(s) Inhalation two times a day  enoxaparin Injectable 40 milliGRAM(s) SubCutaneous every 24 hours  hydrALAZINE 25 milliGRAM(s) Oral three times a day  labetalol 200 milliGRAM(s) Oral three times a day  losartan 100 milliGRAM(s) Oral daily  NIFEdipine XL 90 milliGRAM(s) Oral daily  sodium chloride 0.9%. 1000 milliLiter(s) (75 mL/Hr) IV Continuous <Continuous>  tiotropium 2.5 MICROgram(s) Inhaler 2 Puff(s) Inhalation daily    MEDICATIONS  (PRN):      HOME MEDICATIONS (as per chart review):  albuterol 2.5 mg/0.5 mL (0.5%) inhalation solution (08-26)  Benicar 40 mg oral tablet (01-23)  hydrALAZINE 25 mg oral tablet (08-26)  hydroCHLOROthiazide 25 mg oral tablet (08-26)  labetalol 100 mg oral tablet (01-23)  Trelegy Ellipta 100 mcg-62.5 mcg-25 mcg/inh inhalation powder (08-26)      
74-year-old male past medical history of COPD current smoker, hypertension, "heart problem" presenting to ED for evaluation of shortness of breath and lightheadedness worse with ambulation for the last 2 days. Otherwise denies any fever, chills, headache, changes in vision, cough, congestion, cp, palpitations,  n/v/d, abd pain, constipation, urinary complaints, lower extremity pain/swelling.    24H events:    Patient is a 74y old Male who presents with a chief complaint of Dizziness (27 Aug 2024 10:36)    Primary diagnosis of Intracranial meningioma        Today is 1d of hospitalization. This morning patient was seen and examined at bedside, resting comfortably in bed.    No acute or major events overnight.      PAST MEDICAL & SURGICAL HISTORY  Hypertension    COPD, mild    Thoracic aortic aneurysm    Chronic hyponatremia    Emphysema/COPD    No significant past surgical history      SOCIAL HISTORY:  Social History:      ALLERGIES:  No Known Allergies    MEDICATIONS:  STANDING MEDICATIONS  budesonide  80 MICROgram(s)/formoterol 4.5 MICROgram(s) Inhaler 2 Puff(s) Inhalation two times a day  enoxaparin Injectable 40 milliGRAM(s) SubCutaneous every 24 hours  hydrALAZINE 25 milliGRAM(s) Oral three times a day  labetalol 200 milliGRAM(s) Oral three times a day  losartan 100 milliGRAM(s) Oral daily  NIFEdipine XL 90 milliGRAM(s) Oral daily  sodium chloride 0.9%. 1000 milliLiter(s) IV Continuous <Continuous>  tiotropium 2.5 MICROgram(s) Inhaler 2 Puff(s) Inhalation daily    PRN MEDICATIONS    VITALS:   T(F): 98.5  HR: 85  BP: 165/87  RR: 17  SpO2: 97%    PHYSICAL EXAM:  GENERAL:   ( x) NAD, lying in bed comfortably     (  ) obtunded     (  ) lethargic     (  ) somnolent      NECK:  (x) Supple     (  ) neck stiffness     (  ) nuchal rigidity     (  )  no JVD     (  ) JVD present ( -- cm)    HEART:  Rate -->     (x) normal rate     (  ) bradycardic     (  ) tachycardic  Rhythm -->     (x) regular     (  ) regularly irregular     (  ) irregularly irregular  Murmurs -->     (x) normal s1s2     (  ) systolic murmur     (  ) diastolic murmur     (  ) continuous murmur      (  ) S3 present     (  ) S4 present    LUNGS:   ( x)Unlabored respirations     (  ) tachypnea  ( x) B/L air entry     (  ) decreased breath sounds in:  (location     )    ( x) no adventitious sound     (  ) crackles     (  ) wheezing      (  ) rhonchi      (specify location:       )  (  ) chest wall tenderness (specify location:       )    ABDOMEN:   ( x) Soft     (  ) tense   |   (  ) nondistended     (  ) distended   |   (  ) +BS     (  ) hypoactive bowel sounds     (  ) hyperactive bowel sounds  ( x) nontender     (  ) RUQ tenderness     (  ) RLQ tenderness     (  ) LLQ tenderness     (  ) epigastric tenderness     (  ) diffuse tenderness  (  ) Splenomegaly      (  ) Hepatomegaly      (  ) Jaundice     (  ) ecchymosis     EXTREMITIES:  ( x) Normal     (  ) Rash     (  ) ecchymosis     (  ) varicose veins      (  ) pitting edema     (  ) non-pitting edema   (  ) ulceration     (  ) gangrene:     (location:     )    NERVOUS SYSTEM:    ( x) A&Ox3     (  ) confused     (  ) lethargic  CN II-XII:     ( x) Intact     (  ) deficits found     (Specify:     )   Upper extremities:     (  ) no sensorimotor deficits     (  ) weakness     (  ) loss of proprioception/vibration     (  ) loss of touch/temperature (specify:    )  Lower extremities:     (  ) no sensorimotor deficits     (  ) weakness     (  ) loss of proprioception/vibration     (  ) loss of touch/temperature (specify:    )    SKIN:   (  ) No rashes or lesions     (  ) maculopapular rash     (  ) pustules     (  ) vesicles     (  ) ulcer     (  ) ecchymosis     (specify location:     )      LABS:                        15.1   6.24  )-----------( 284      ( 27 Aug 2024 09:35 )             44.1     08-27    135  |  99  |  13  ----------------------------<  154<H>  4.4   |  29  |  0.9    Ca    8.7      27 Aug 2024 09:35  Mg     2.3     08-27    TPro  6.7  /  Alb  4.1  /  TBili  1.0  /  DBili  x   /  AST  14  /  ALT  19  /  AlkPhos  54  08-27      Urinalysis Basic - ( 27 Aug 2024 09:35 )    Color: x / Appearance: x / SG: x / pH: x  Gluc: 154 mg/dL / Ketone: x  / Bili: x / Urobili: x   Blood: x / Protein: x / Nitrite: x   Leuk Esterase: x / RBC: x / WBC x   Sq Epi: x / Non Sq Epi: x / Bacteria: x

## 2024-08-29 NOTE — DISCHARGE NOTE NURSING/CASE MANAGEMENT/SOCIAL WORK - PATIENT PORTAL LINK FT
You can access the FollowMyHealth Patient Portal offered by [facility] by registering at the following website: [email or web address]. By joining [employer]’s FollowMyHealth portal, you will also be able to view your health information using other applications (apps) compatible with our system.

## 2024-09-03 NOTE — CDI QUERY NOTE - NSCDIOTHERTXTBX_GEN_ALL_CORE_HH
Clinical documentation and/or evidence in the medical record indicates that this patient has a radiology result without a corresponding documented diagnosis. In order to ensure accurate coding and accuracy of the clinical record, the documentation in this patient’s medical record requires additional clarification.      Please include more specific documentation of a diagnosis associated with this radiological finding (if clinically significant) in your progress note and/or discharge summary.    Please clarify if the imaging report of mass effect can be further specified as:     •  Brain compression associated with mass effect identified on imaging report  •  Clinically insignificant finding of mass effect on imaging report  •  Other (please specify):    Supporting documentation and/or clinical evidence:     8/29 Discharge Note Provider: … Neurosx planned to do an angiogram with MMA embolization, in addition to meningioma resection. IR planned to do a CT-guided biopsy of the lung mass. However, patient and patient's family refused to do any of these procedures, and expressed their wish to be discharged … PRINCIPAL DISCHARGE DIAGNOSIS: Intracranial meningioma    8/26 CT Brain: … Right middle cranial fossa questionable isodense 3.4 cm mass, not present January 2021; possible noncalcified meningioma (series 6, image 41). Apparent mass effect on subjacent temporal lobe    8/26 MRI Brain: … There is a dural-based enhancing mass arising from the anterior wall of the right middle cranial fossa measuring 3.7 x 3.2 x 3.7 cm (transverse, AP, CC). There is dural thickening/enhancement extending over the right temporal and frontal convexities, more prominent than typically expected. There is no significant perilesional edema. There is focal mass effect upon the right frontal and temporal lobes, with no midline shift.      Thank you,  Germania Coelho RN [facility] CCDS  [phone]

## 2024-09-05 DIAGNOSIS — I25.10 ATHEROSCLEROTIC HEART DISEASE OF NATIVE CORONARY ARTERY WITHOUT ANGINA PECTORIS: ICD-10-CM

## 2024-09-05 DIAGNOSIS — J44.9 CHRONIC OBSTRUCTIVE PULMONARY DISEASE, UNSPECIFIED: ICD-10-CM

## 2024-09-05 DIAGNOSIS — D32.0 BENIGN NEOPLASM OF CEREBRAL MENINGES: ICD-10-CM

## 2024-09-05 DIAGNOSIS — M89.9 DISORDER OF BONE, UNSPECIFIED: ICD-10-CM

## 2024-09-05 DIAGNOSIS — F17.210 NICOTINE DEPENDENCE, CIGARETTES, UNCOMPLICATED: ICD-10-CM

## 2024-09-05 DIAGNOSIS — R22.0 LOCALIZED SWELLING, MASS AND LUMP, HEAD: ICD-10-CM

## 2024-09-05 DIAGNOSIS — E87.1 HYPO-OSMOLALITY AND HYPONATREMIA: ICD-10-CM

## 2024-09-05 DIAGNOSIS — E07.9 DISORDER OF THYROID, UNSPECIFIED: ICD-10-CM

## 2024-09-05 DIAGNOSIS — I10 ESSENTIAL (PRIMARY) HYPERTENSION: ICD-10-CM

## 2024-09-05 DIAGNOSIS — I71.20 THORACIC AORTIC ANEURYSM, WITHOUT RUPTURE, UNSPECIFIED: ICD-10-CM

## 2024-09-05 DIAGNOSIS — Z79.899 OTHER LONG TERM (CURRENT) DRUG THERAPY: ICD-10-CM

## 2024-09-05 DIAGNOSIS — R91.8 OTHER NONSPECIFIC ABNORMAL FINDING OF LUNG FIELD: ICD-10-CM

## 2024-09-05 DIAGNOSIS — D36.14 BENIGN NEOPLASM OF PERIPHERAL NERVES AND AUTONOMIC NERVOUS SYSTEM OF THORAX: ICD-10-CM

## 2024-09-17 ENCOUNTER — NON-APPOINTMENT (OUTPATIENT)
Age: 74
End: 2024-09-17

## 2024-09-18 ENCOUNTER — APPOINTMENT (OUTPATIENT)
Dept: NEUROSURGERY | Facility: CLINIC | Age: 74
End: 2024-09-18

## 2024-09-18 VITALS
WEIGHT: 160 LBS | BODY MASS INDEX: 25.11 KG/M2 | DIASTOLIC BLOOD PRESSURE: 82 MMHG | HEIGHT: 66.93 IN | SYSTOLIC BLOOD PRESSURE: 125 MMHG

## 2024-09-18 DIAGNOSIS — R91.8 OTHER NONSPECIFIC ABNORMAL FINDING OF LUNG FIELD: ICD-10-CM

## 2024-09-18 DIAGNOSIS — F17.200 NICOTINE DEPENDENCE, UNSPECIFIED, UNCOMPLICATED: ICD-10-CM

## 2024-09-18 DIAGNOSIS — Z09 ENCOUNTER FOR FOLLOW-UP EXAMINATION AFTER COMPLETED TREATMENT FOR CONDITIONS OTHER THAN MALIGNANT NEOPLASM: ICD-10-CM

## 2024-09-18 DIAGNOSIS — D32.0 BENIGN NEOPLASM OF CEREBRAL MENINGES: ICD-10-CM

## 2024-09-18 PROCEDURE — 99204 OFFICE O/P NEW MOD 45 MIN: CPT

## 2024-09-18 NOTE — HISTORY OF PRESENT ILLNESS
[de-identified] : Mr. MURILLO is a 74-year-old male presenting to the office today post hospital discharge.   Of note, he is with his wife who does not wish to use the , patient also declines however neither speak English. The daughter was called and placed on speaker by the wife who then communicated with the  and translated for her parents.   Patient presented to the emergency department on 8/26/2024 with complaints of dizziness. Lightheadedness worse with ambulation. Diagnostic imaging including a CT head revealed a right middle cranial fossa isodense mass measuring 3.4 cm. This was not present back in 2021. Identified as a possible noncalcified meningioma. MRI head showed a dural based enhancing mass arising from the anterior wall of the right middle cranial fossa measuring 3.7 cm. MRI thoracic spine incidentally identified a left medial lung apex mass measuring about 4 cm.   Inpatient plan was to perform an MMA embolization in addition to meningioma resection and a CT-guided biopsy of the lung mass however patient and patient's family refused and expressed that they wanted to discuss further before returning to the outpatient office. The importance of undergoing a biopsy was once again expressed today. Treatment options for the meningioma were discussed including MMA embolization followed by the resection. Daughter expressed that she was concerned regarding the risks of brain surgery however was educated that there are also risks of doing nothing which are greater then undergoing the surgery for removal.  We offered to arrange an outpatient appointment with cardiothoracic surgeon, Dr. Echevarria, however the daughter deferred at this time. We also rendered information regarding pretesting and steps to plan the surgery for resection however it was refused at this time. Mr. MURILLO speaks Cantonese and so when the  was asked to speak to him directly regarding his wishes, rather than the daughter, he too stated that he did not want to do anything.   Mr. MURILLO and his family were encouraged to reconsider for his wellbeing and were informed that they can contact the office or return at any time should they wish to discuss further.

## 2024-09-18 NOTE — ASSESSMENT
[FreeTextEntry1] : 73yo M post hospital discharge. He was worked up for dizziness and lightheadedness. Diagnostic imaging incidentally identified a meningioma and a lung mass. The patient and his family refuse to undergo any further workup or testing. Lung biopsy was suggested both inpatient and at this time, refused. Embolization followed by resection of meningioma discussed, also refused.   Mr. MURILLO and his family were encouraged to contact the office or return to the office at any time to further discuss treatment plans should they wish to proceed. Patient and daughter verbalized understanding of the severity of health consequences of not proceeding with any treatments at all.   Lizzy Gutiérrez MS, FNP-BC Harrison Conteh MD, FRCS

## 2024-09-18 NOTE — REASON FOR VISIT
[New Patient Visit] : a new patient visit [Spouse] : spouse [[employer] ] : provided by [employer]   [Time Spent: ____ minutes] : Total time spent using  services: [unfilled] minutes. The patient's primary language is not English thus required  services. [Interpreters_IDNumber] : [id number] [Interpreters_FullName] : MAGGIE [FreeTextEntry3] : TIME STARTED AT 10:18AM [TWNoteComboBox1] : Aki Patient/Caregiver provided printed discharge information.

## 2024-10-30 ENCOUNTER — APPOINTMENT (OUTPATIENT)
Dept: NEUROSURGERY | Facility: CLINIC | Age: 74
End: 2024-10-30

## 2024-11-05 ENCOUNTER — APPOINTMENT (OUTPATIENT)
Dept: CARDIOTHORACIC SURGERY | Facility: CLINIC | Age: 74
End: 2024-11-05
Payer: MEDICARE

## 2024-11-05 VITALS
SYSTOLIC BLOOD PRESSURE: 143 MMHG | WEIGHT: 175 LBS | HEIGHT: 66 IN | HEART RATE: 87 BPM | BODY MASS INDEX: 28.12 KG/M2 | OXYGEN SATURATION: 93 % | DIASTOLIC BLOOD PRESSURE: 94 MMHG

## 2024-11-05 PROCEDURE — 99204 OFFICE O/P NEW MOD 45 MIN: CPT

## 2024-11-05 RX ORDER — LABETALOL HYDROCHLORIDE 200 MG/1
200 TABLET, FILM COATED ORAL 3 TIMES DAILY
Refills: 0 | Status: ACTIVE | COMMUNITY

## 2024-11-05 RX ORDER — OLMESARTAN MEDOXOMIL 40 MG/1
40 TABLET, FILM COATED ORAL DAILY
Refills: 0 | Status: ACTIVE | COMMUNITY

## 2024-11-05 RX ORDER — FLUTICASONE FUROATE, UMECLIDINIUM BROMIDE AND VILANTEROL TRIFENATATE 100; 62.5; 25 UG/1; UG/1; UG/1
100-62.5-25 POWDER RESPIRATORY (INHALATION) DAILY
Refills: 0 | Status: ACTIVE | COMMUNITY

## 2024-11-05 RX ORDER — MECLIZINE HYDROCHLORIDE 25 MG/1
25 TABLET ORAL
Refills: 0 | Status: ACTIVE | COMMUNITY

## 2024-11-05 RX ORDER — HYDRALAZINE HYDROCHLORIDE 25 MG/1
25 TABLET ORAL 3 TIMES DAILY
Refills: 0 | Status: ACTIVE | COMMUNITY

## 2024-11-05 RX ORDER — ALBUTEROL SULFATE 90 UG/1
108 (90 BASE) INHALANT RESPIRATORY (INHALATION) EVERY 6 HOURS
Refills: 0 | Status: ACTIVE | COMMUNITY

## 2024-11-14 ENCOUNTER — NON-APPOINTMENT (OUTPATIENT)
Age: 74
End: 2024-11-14

## 2025-04-14 ENCOUNTER — APPOINTMENT (OUTPATIENT)
Dept: CARDIOTHORACIC SURGERY | Facility: CLINIC | Age: 75
End: 2025-04-14
Payer: MEDICARE

## 2025-04-14 VITALS
HEART RATE: 81 BPM | RESPIRATION RATE: 16 BRPM | WEIGHT: 170 LBS | OXYGEN SATURATION: 94 % | SYSTOLIC BLOOD PRESSURE: 133 MMHG | BODY MASS INDEX: 27.32 KG/M2 | TEMPERATURE: 98.2 F | DIASTOLIC BLOOD PRESSURE: 91 MMHG | HEIGHT: 66 IN

## 2025-04-14 DIAGNOSIS — R91.8 OTHER NONSPECIFIC ABNORMAL FINDING OF LUNG FIELD: ICD-10-CM

## 2025-04-14 PROCEDURE — 99214 OFFICE O/P EST MOD 30 MIN: CPT

## 2025-04-14 RX ORDER — ALBUTEROL SULFATE 90 UG/1
108 (90 BASE) AEROSOL, METERED RESPIRATORY (INHALATION) AS DIRECTED
Refills: 0 | Status: ACTIVE | COMMUNITY

## 2025-04-14 RX ORDER — HYDROCHLOROTHIAZIDE 25 MG/1
25 TABLET ORAL DAILY
Refills: 0 | Status: ACTIVE | COMMUNITY

## 2025-04-14 RX ORDER — FOLIC ACID 1 MG/1
1 TABLET ORAL DAILY
Refills: 0 | Status: ACTIVE | COMMUNITY

## 2025-04-21 ENCOUNTER — APPOINTMENT (OUTPATIENT)
Dept: CARDIOTHORACIC SURGERY | Facility: CLINIC | Age: 75
End: 2025-04-21
Payer: MEDICARE

## 2025-04-21 VITALS
OXYGEN SATURATION: 94 % | DIASTOLIC BLOOD PRESSURE: 91 MMHG | RESPIRATION RATE: 16 BRPM | HEIGHT: 66 IN | TEMPERATURE: 98.7 F | SYSTOLIC BLOOD PRESSURE: 131 MMHG

## 2025-04-21 DIAGNOSIS — R22.2 LOCALIZED SWELLING, MASS AND LUMP, TRUNK: ICD-10-CM

## 2025-04-21 PROCEDURE — 99213 OFFICE O/P EST LOW 20 MIN: CPT

## 2025-04-22 ENCOUNTER — NON-APPOINTMENT (OUTPATIENT)
Age: 75
End: 2025-04-22

## 2025-08-18 ENCOUNTER — APPOINTMENT (OUTPATIENT)
Dept: CARDIOTHORACIC SURGERY | Facility: CLINIC | Age: 75
End: 2025-08-18

## 2025-08-18 DIAGNOSIS — R91.8 OTHER NONSPECIFIC ABNORMAL FINDING OF LUNG FIELD: ICD-10-CM
